# Patient Record
Sex: MALE | Race: WHITE | URBAN - METROPOLITAN AREA
[De-identification: names, ages, dates, MRNs, and addresses within clinical notes are randomized per-mention and may not be internally consistent; named-entity substitution may affect disease eponyms.]

---

## 2018-06-20 ENCOUNTER — PAT TELEPHONE (OUTPATIENT)
Dept: PREADMISSION TESTING | Age: 73
End: 2018-06-20

## 2018-06-20 VITALS — BODY MASS INDEX: 23.8 KG/M2 | WEIGHT: 170 LBS | HEIGHT: 71 IN

## 2018-06-20 RX ORDER — M-VIT,TX,IRON,MINS/CALC/FOLIC 27MG-0.4MG
1 TABLET ORAL DAILY
COMMUNITY

## 2018-06-20 RX ORDER — BENAZEPRIL HYDROCHLORIDE 40 MG/1
40 TABLET, FILM COATED ORAL DAILY
COMMUNITY

## 2018-06-20 RX ORDER — PRAVASTATIN SODIUM 80 MG/1
80 TABLET ORAL DAILY
COMMUNITY

## 2018-06-20 RX ORDER — AMLODIPINE BESYLATE 5 MG/1
5 TABLET ORAL DAILY
COMMUNITY

## 2018-06-28 ENCOUNTER — HOSPITAL ENCOUNTER (OUTPATIENT)
Dept: ENDOSCOPY | Age: 73
Discharge: HOME | End: 2018-06-28
Attending: INTERNAL MEDICINE | Admitting: INTERNAL MEDICINE

## 2018-06-28 VITALS
BODY MASS INDEX: 23.29 KG/M2 | RESPIRATION RATE: 16 BRPM | DIASTOLIC BLOOD PRESSURE: 65 MMHG | OXYGEN SATURATION: 100 % | TEMPERATURE: 97 F | SYSTOLIC BLOOD PRESSURE: 107 MMHG | WEIGHT: 167 LBS | HEART RATE: 52 BPM

## 2018-06-28 DIAGNOSIS — Z12.11 ENCOUNTER FOR SCREENING FOR MALIGNANT NEOPLASM OF COLON: ICD-10-CM

## 2018-06-28 RX ORDER — MORPHINE SULFATE 4 MG/ML
1 INJECTION, SOLUTION INTRAMUSCULAR; INTRAVENOUS EVERY 5 MIN PRN
Status: DISCONTINUED | OUTPATIENT
Start: 2018-06-28 | End: 2018-06-29 | Stop reason: HOSPADM

## 2018-06-28 RX ORDER — LABETALOL HYDROCHLORIDE 5 MG/ML
5 INJECTION, SOLUTION INTRAVENOUS EVERY 10 MIN PRN
Status: DISCONTINUED | OUTPATIENT
Start: 2018-06-28 | End: 2018-06-29 | Stop reason: HOSPADM

## 2018-06-28 RX ORDER — LIDOCAINE HYDROCHLORIDE 10 MG/ML
1 INJECTION, SOLUTION EPIDURAL; INFILTRATION; INTRACAUDAL; PERINEURAL
Status: ACTIVE | OUTPATIENT
Start: 2018-06-28 | End: 2018-06-28

## 2018-06-28 RX ORDER — DIPHENHYDRAMINE HYDROCHLORIDE 50 MG/ML
12.5 INJECTION INTRAMUSCULAR; INTRAVENOUS
Status: ACTIVE | OUTPATIENT
Start: 2018-06-28 | End: 2018-06-28

## 2018-06-28 RX ORDER — PROMETHAZINE HYDROCHLORIDE 25 MG/ML
6.25 INJECTION, SOLUTION INTRAMUSCULAR; INTRAVENOUS
Status: ACTIVE | OUTPATIENT
Start: 2018-06-28 | End: 2018-06-28

## 2018-06-28 RX ORDER — OXYCODONE HYDROCHLORIDE 5 MG/1
10 TABLET ORAL PRN
Status: ACTIVE | OUTPATIENT
Start: 2018-06-28 | End: 2018-06-28

## 2018-06-28 RX ORDER — SODIUM CHLORIDE 0.9 % (FLUSH) 0.9 %
10 SYRINGE (ML) INJECTION EVERY 12 HOURS SCHEDULED
Status: DISCONTINUED | OUTPATIENT
Start: 2018-06-28 | End: 2018-06-29 | Stop reason: HOSPADM

## 2018-06-28 RX ORDER — MEPERIDINE HYDROCHLORIDE 25 MG/ML
12.5 INJECTION INTRAMUSCULAR; INTRAVENOUS; SUBCUTANEOUS EVERY 5 MIN PRN
Status: DISCONTINUED | OUTPATIENT
Start: 2018-06-28 | End: 2018-06-29 | Stop reason: HOSPADM

## 2018-06-28 RX ORDER — SODIUM CHLORIDE 0.9 % (FLUSH) 0.9 %
10 SYRINGE (ML) INJECTION PRN
Status: DISCONTINUED | OUTPATIENT
Start: 2018-06-28 | End: 2018-06-29 | Stop reason: HOSPADM

## 2018-06-28 RX ORDER — OXYCODONE HYDROCHLORIDE 5 MG/1
5 TABLET ORAL PRN
Status: ACTIVE | OUTPATIENT
Start: 2018-06-28 | End: 2018-06-28

## 2018-06-28 RX ORDER — HYDRALAZINE HYDROCHLORIDE 20 MG/ML
5 INJECTION INTRAMUSCULAR; INTRAVENOUS EVERY 10 MIN PRN
Status: DISCONTINUED | OUTPATIENT
Start: 2018-06-28 | End: 2018-06-29 | Stop reason: HOSPADM

## 2018-06-28 RX ORDER — SODIUM CHLORIDE 9 MG/ML
INJECTION, SOLUTION INTRAVENOUS CONTINUOUS
Status: DISCONTINUED | OUTPATIENT
Start: 2018-06-28 | End: 2018-06-29 | Stop reason: HOSPADM

## 2018-06-28 RX ORDER — METOCLOPRAMIDE HYDROCHLORIDE 5 MG/ML
10 INJECTION INTRAMUSCULAR; INTRAVENOUS
Status: ACTIVE | OUTPATIENT
Start: 2018-06-28 | End: 2018-06-28

## 2018-06-28 RX ADMIN — SODIUM CHLORIDE: 9 INJECTION, SOLUTION INTRAVENOUS at 11:42

## 2018-06-28 ASSESSMENT — PAIN SCALES - WONG BAKER: WONGBAKER_NUMERICALRESPONSE: 0

## 2018-06-28 ASSESSMENT — PAIN - FUNCTIONAL ASSESSMENT: PAIN_FUNCTIONAL_ASSESSMENT: 0-10

## 2018-06-28 ASSESSMENT — PAIN SCALES - GENERAL
PAINLEVEL_OUTOF10: 0

## 2023-05-26 ENCOUNTER — ANESTHESIA EVENT (OUTPATIENT)
Dept: ENDOSCOPY | Age: 78
End: 2023-05-26
Payer: MEDICARE

## 2023-05-30 ENCOUNTER — ANESTHESIA (OUTPATIENT)
Dept: ENDOSCOPY | Age: 78
End: 2023-05-30
Payer: MEDICARE

## 2023-05-30 ENCOUNTER — HOSPITAL ENCOUNTER (OUTPATIENT)
Age: 78
Setting detail: OUTPATIENT SURGERY
Discharge: HOME | End: 2023-05-30
Attending: INTERNAL MEDICINE | Admitting: INTERNAL MEDICINE
Payer: MEDICARE

## 2023-05-30 VITALS
WEIGHT: 136.91 LBS | HEART RATE: 78 BPM | HEIGHT: 70 IN | BODY MASS INDEX: 19.6 KG/M2 | RESPIRATION RATE: 40 BRPM | SYSTOLIC BLOOD PRESSURE: 131 MMHG | OXYGEN SATURATION: 97 % | DIASTOLIC BLOOD PRESSURE: 70 MMHG | TEMPERATURE: 97.4 F

## 2023-05-30 DIAGNOSIS — R13.10 DYSPHAGIA, UNSPECIFIED TYPE: ICD-10-CM

## 2023-05-30 PROCEDURE — 2709999900 HC NON-CHARGEABLE SUPPLY: Performed by: INTERNAL MEDICINE

## 2023-05-30 PROCEDURE — 7100000011 HC PHASE II RECOVERY - ADDTL 15 MIN: Performed by: INTERNAL MEDICINE

## 2023-05-30 PROCEDURE — 3700000000 HC ANESTHESIA ATTENDED CARE: Performed by: INTERNAL MEDICINE

## 2023-05-30 PROCEDURE — 94761 N-INVAS EAR/PLS OXIMETRY MLT: CPT

## 2023-05-30 PROCEDURE — 7100000010 HC PHASE II RECOVERY - FIRST 15 MIN: Performed by: INTERNAL MEDICINE

## 2023-05-30 PROCEDURE — 3609013500 HC EGD REMOVAL TUMOR POLYP/OTHER LESION SNARE TECH: Performed by: INTERNAL MEDICINE

## 2023-05-30 PROCEDURE — 2500000003 HC RX 250 WO HCPCS: Performed by: STUDENT IN AN ORGANIZED HEALTH CARE EDUCATION/TRAINING PROGRAM

## 2023-05-30 PROCEDURE — 6370000000 HC RX 637 (ALT 250 FOR IP): Performed by: STUDENT IN AN ORGANIZED HEALTH CARE EDUCATION/TRAINING PROGRAM

## 2023-05-30 PROCEDURE — 2720000010 HC SURG SUPPLY STERILE: Performed by: INTERNAL MEDICINE

## 2023-05-30 PROCEDURE — 7100000001 HC PACU RECOVERY - ADDTL 15 MIN: Performed by: INTERNAL MEDICINE

## 2023-05-30 PROCEDURE — 3700000001 HC ADD 15 MINUTES (ANESTHESIA): Performed by: INTERNAL MEDICINE

## 2023-05-30 PROCEDURE — 2580000003 HC RX 258: Performed by: ANESTHESIOLOGY

## 2023-05-30 PROCEDURE — 6360000002 HC RX W HCPCS: Performed by: NURSE ANESTHETIST, CERTIFIED REGISTERED

## 2023-05-30 PROCEDURE — 7100000000 HC PACU RECOVERY - FIRST 15 MIN: Performed by: INTERNAL MEDICINE

## 2023-05-30 PROCEDURE — 3609012400 HC EGD TRANSORAL BIOPSY SINGLE/MULTIPLE: Performed by: INTERNAL MEDICINE

## 2023-05-30 PROCEDURE — 2500000003 HC RX 250 WO HCPCS: Performed by: NURSE ANESTHETIST, CERTIFIED REGISTERED

## 2023-05-30 PROCEDURE — 94640 AIRWAY INHALATION TREATMENT: CPT

## 2023-05-30 PROCEDURE — 88305 TISSUE EXAM BY PATHOLOGIST: CPT

## 2023-05-30 RX ORDER — SODIUM CHLORIDE 9 MG/ML
INJECTION, SOLUTION INTRAVENOUS PRN
Status: DISCONTINUED | OUTPATIENT
Start: 2023-05-30 | End: 2023-05-30 | Stop reason: HOSPADM

## 2023-05-30 RX ORDER — SODIUM CHLORIDE 0.9 % (FLUSH) 0.9 %
5-40 SYRINGE (ML) INJECTION EVERY 12 HOURS SCHEDULED
Status: DISCONTINUED | OUTPATIENT
Start: 2023-05-30 | End: 2023-05-30 | Stop reason: HOSPADM

## 2023-05-30 RX ORDER — PANTOPRAZOLE SODIUM 40 MG/1
40 TABLET, DELAYED RELEASE ORAL
Qty: 30 TABLET | Refills: 5 | Status: SHIPPED | OUTPATIENT
Start: 2023-05-30

## 2023-05-30 RX ORDER — LABETALOL HYDROCHLORIDE 5 MG/ML
10 INJECTION, SOLUTION INTRAVENOUS
Status: DISCONTINUED | OUTPATIENT
Start: 2023-05-30 | End: 2023-05-30 | Stop reason: HOSPADM

## 2023-05-30 RX ORDER — GLYCOPYRROLATE 0.2 MG/ML
INJECTION INTRAMUSCULAR; INTRAVENOUS PRN
Status: DISCONTINUED | OUTPATIENT
Start: 2023-05-30 | End: 2023-05-30 | Stop reason: SDUPTHER

## 2023-05-30 RX ORDER — SODIUM CHLORIDE 0.9 % (FLUSH) 0.9 %
5-40 SYRINGE (ML) INJECTION PRN
Status: DISCONTINUED | OUTPATIENT
Start: 2023-05-30 | End: 2023-05-30 | Stop reason: HOSPADM

## 2023-05-30 RX ORDER — PROPOFOL 10 MG/ML
INJECTION, EMULSION INTRAVENOUS CONTINUOUS PRN
Status: DISCONTINUED | OUTPATIENT
Start: 2023-05-30 | End: 2023-05-30 | Stop reason: SDUPTHER

## 2023-05-30 RX ORDER — IPRATROPIUM BROMIDE AND ALBUTEROL SULFATE 2.5; .5 MG/3ML; MG/3ML
1 SOLUTION RESPIRATORY (INHALATION)
Status: COMPLETED | OUTPATIENT
Start: 2023-05-30 | End: 2023-05-30

## 2023-05-30 RX ORDER — HYDRALAZINE HYDROCHLORIDE 20 MG/ML
10 INJECTION INTRAMUSCULAR; INTRAVENOUS
Status: DISCONTINUED | OUTPATIENT
Start: 2023-05-30 | End: 2023-05-30 | Stop reason: HOSPADM

## 2023-05-30 RX ORDER — ONDANSETRON 2 MG/ML
4 INJECTION INTRAMUSCULAR; INTRAVENOUS
Status: DISCONTINUED | OUTPATIENT
Start: 2023-05-30 | End: 2023-05-30 | Stop reason: HOSPADM

## 2023-05-30 RX ORDER — LIDOCAINE HYDROCHLORIDE 20 MG/ML
INJECTION, SOLUTION EPIDURAL; INFILTRATION; INTRACAUDAL; PERINEURAL PRN
Status: DISCONTINUED | OUTPATIENT
Start: 2023-05-30 | End: 2023-05-30 | Stop reason: SDUPTHER

## 2023-05-30 RX ORDER — LIDOCAINE HYDROCHLORIDE 10 MG/ML
INJECTION, SOLUTION EPIDURAL; INFILTRATION; INTRACAUDAL; PERINEURAL
Status: DISCONTINUED
Start: 2023-05-30 | End: 2023-05-30 | Stop reason: HOSPADM

## 2023-05-30 RX ORDER — GLYCOPYRROLATE 0.2 MG/ML
INJECTION INTRAMUSCULAR; INTRAVENOUS
Status: COMPLETED
Start: 2023-05-30 | End: 2023-05-30

## 2023-05-30 RX ADMIN — LIDOCAINE HYDROCHLORIDE 50 MG: 20 INJECTION, SOLUTION EPIDURAL; INFILTRATION; INTRACAUDAL; PERINEURAL at 10:15

## 2023-05-30 RX ADMIN — GLYCOPYRROLATE 0.2 MG: 0.2 INJECTION, SOLUTION INTRAMUSCULAR; INTRAVENOUS at 10:15

## 2023-05-30 RX ADMIN — PROPOFOL 80 MG: 10 INJECTION, EMULSION INTRAVENOUS at 09:29

## 2023-05-30 RX ADMIN — PROPOFOL 180 MCG/KG/MIN: 10 INJECTION, EMULSION INTRAVENOUS at 09:28

## 2023-05-30 RX ADMIN — SODIUM CHLORIDE: 9 INJECTION, SOLUTION INTRAVENOUS at 08:58

## 2023-05-30 RX ADMIN — IPRATROPIUM BROMIDE AND ALBUTEROL SULFATE 1 DOSE: 2.5; .5 SOLUTION RESPIRATORY (INHALATION) at 10:08

## 2023-05-30 RX ADMIN — PROPOFOL 30 MG: 10 INJECTION, EMULSION INTRAVENOUS at 09:30

## 2023-05-30 RX ADMIN — LIDOCAINE HYDROCHLORIDE 80 MG: 20 INJECTION, SOLUTION EPIDURAL; INFILTRATION; INTRACAUDAL; PERINEURAL at 09:28

## 2023-05-30 ASSESSMENT — PAIN SCALES - GENERAL
PAINLEVEL_OUTOF10: 0

## 2023-05-30 ASSESSMENT — LIFESTYLE VARIABLES: SMOKING_STATUS: 0

## 2023-05-30 ASSESSMENT — PAIN - FUNCTIONAL ASSESSMENT: PAIN_FUNCTIONAL_ASSESSMENT: 0-10

## 2023-05-30 ASSESSMENT — ENCOUNTER SYMPTOMS: SHORTNESS OF BREATH: 1

## 2023-05-30 NOTE — ANESTHESIA POSTPROCEDURE EVALUATION
Department of Anesthesiology  Postprocedure Note    Patient: Danish De La O  MRN: [id number]  [place]: 1945  Date of evaluation: 5/30/2023      Procedure Summary     Date: 05/30/23 Room / Location: [address] [place] [address]    Anesthesia Start: 8274 Anesthesia Stop: 0951    Procedures:       EGD BIOPSY      EGD POLYP HOT SNARE Diagnosis:       Dysphagia, unspecified type      (DYSPHAGIA)    Surgeons: Mecca Ty MD Responsible Provider: Wander Mercado MD    Anesthesia Type: MAC ASA Status: 3          Anesthesia Type: MAC    Fredo Phase I: Fredo Score: 8    Fredo Phase II: Fredo Score: 10      Anesthesia Post Evaluation    Patient location during evaluation: PACU  Patient participation: complete - patient participated  Level of consciousness: awake and alert  Airway patency: patent  Nausea & Vomiting: no nausea and no vomiting  Complications: no  Cardiovascular status: hemodynamically stable and blood pressure returned to baseline  Respiratory status: spontaneous ventilation and nonlabored ventilation  Hydration status: stable  Comments: Work of breathing greatly improved. Using incentive spirometer well. Weaned to room air. Continues to saturate well. Anticipate return to [facility] for planned discharge home with .

## 2023-05-30 NOTE — ANESTHESIA PRE PROCEDURE
Department of Anesthesiology  Preprocedure Note       Name:  Jo-Ann Oscar   Age:  68 y.o.  :  1945                                          MRN:  [id number]         Date:  2023      Surgeon: Salvador Corley):  Lewie Soulier, MD    Procedure: Procedure(s):  ESOPHAGOGASTRODUODENOSCOPY    Medications prior to admission:   Prior to Admission medications    Medication Sig Start Date End Date Taking? Authorizing Provider   benazepril (LOTENSIN) 40 MG tablet Take 1 tablet by mouth daily    Historical Provider, MD   amLODIPine (NORVASC) 5 MG tablet Take 1 tablet by mouth daily    Historical Provider, MD   pravastatin (PRAVACHOL) 80 MG tablet Take 1 tablet by mouth daily    Historical Provider, MD   Multiple Vitamins-Minerals (THERAPEUTIC MULTIVITAMIN-MINERALS) tablet Take 1 tablet by mouth daily    Historical Provider, MD   Glucosamine-Chondroit-Vit C-Mn (GLUCOSAMINE 1500 COMPLEX PO) Take by mouth daily    Historical Provider, MD       Current medications:    Current Facility-Administered Medications   Medication Dose Route Frequency Provider Last Rate Last Admin    sodium chloride flush 0.9 % injection 5-40 mL  5-40 mL IntraVENous 2 times per day Zhanna Miguel MD        sodium chloride flush 0.9 % injection 5-40 mL  5-40 mL IntraVENous PRN Zhanna Miguel MD        0.9 % sodium chloride infusion   IntraVENous PRN Zhanna Miguel  mL/hr at 23 0911 NoRateChange at 23 0911       Allergies:  No Known Allergies    Problem List:  There is no problem list on file for this patient.       Past Medical History:        Diagnosis Date    Hyperlipidemia     Hypertension     Idiopathic pulmonary fibrosis ([address]     Pacemaker     [employer]       Past Surgical History:        Procedure Laterality Date    AORTIC VALVE REPLACEMENT      BACK SURGERY      bone chip removed    COLONOSCOPY  2018    Dr Alberto Vargas, polyps   Danish Yang

## 2023-05-30 NOTE — DISCHARGE INSTRUCTIONS
days____________________                                Call for follow-up appointment on:[phone]______________                  Educational information given on:_____11/3/2020__________________                       Please review these discharge instructions this evening or tomorrow for               information you may have forgotten.

## 2023-05-30 NOTE — ANESTHESIA POSTPROCEDURE EVALUATION
Department of Anesthesiology  Postprocedure Note    Patient: Brandon Gracia  MRN: [id number]  [place]: 1945  Date of evaluation: 5/30/2023      Procedure Summary     Date: 05/30/23 Room / Location: [address] [place] [address]    Anesthesia Start: 4647 Anesthesia Stop: 0951    Procedures:       EGD BIOPSY      EGD POLYP HOT SNARE Diagnosis:       Dysphagia, unspecified type      (DYSPHAGIA)    Surgeons: Kristina Fuller MD Responsible Provider: Brisa Cadena MD    Anesthesia Type: MAC ASA Status: 3          Anesthesia Type: MAC    Fredo Phase I: Fredo Score: 7    Fredo Phase II:        Anesthesia Post Evaluation    Patient location during evaluation: PACU  Patient participation: complete - patient participated  Level of consciousness: awake and alert  Airway patency: patent  Nausea & Vomiting: no nausea and no vomiting  Complications: no  Cardiovascular status: hemodynamically stable and blood pressure returned to baseline  Respiratory status: spontaneous ventilation and nasal cannula  Hydration status: stable  Comments: Productive cough, near chronic baseline per Mr. Polina Torres. Minimal improvement with nebulizer treatment. Glycopyrrolate given to reduce oral secretions. Increased work of breathing. Will transition to non-breather to allow patient to recover before attempting to wean.

## 2023-05-30 NOTE — PROGRESS NOTES
Patient to PACU from Endo. Pt asleep. Pt VSS on room air. Quickly desatted to 86%. Placed on 4L NC. Abd soft.

## 2023-05-30 NOTE — OP NOTE
for multiple 2-5 mm white mucosal plaques in the proximal esophagus. These lesions were biopsied to evaluate for lichen planus. A widely patent Schatzki's ring was noted at the GE junction, 37 cm from the incisors. Overlying this Schatzki's ring was a 1 cm island of salmon-colored mucosa. The distal esophagus was examined with white light and narrowband imaging without any other concerning areas. The Schatzki's ring an island of salmon mucosa was biopsied in four-quadrant fashion to dilate the esophagus, as the estimated inner diameter was larger than 20 mm. Stomach: The stomach was examined on forward and retroflexed views. A 3 cm sliding hiatal hernia was present 37 to 40 cm from the incisors. Multiple gastric polyps were present in the gastric body and fundus, including 2 8-10 mm semipedunculated polyps. The 8 and 10 mm semipedunculated polyps were resected with snare cautery and retrieved with Tad Has net. The gastric antrum was notable for mucosal erythema and flecks of hematin without ulceration or erosion. Biopsies were obtained from the gastric antrum, incisura and body to evaluate for H. Pylori. Duodenum: The first and 2nd portions of the duodenum appeared normal with normal villous pattern    The scope was then withdrawn back into the stomach, it was decompressed, and the scope was completely withdrawn. The patient tolerated the procedure well and was taken to the post anesthesia care unit in good condition. Estimated Blood Loss: Minimal     Impression:   1) See post procedure diagnoses    Recommendations:   - The patient is to check the patient portal for pathology results in 7-10 days. - Avoid NSAIDs, and begin pantoprazole 40 mg daily. Resume regular medications otherwise. - Resume diet as tolerated. - Return to the office in 4-6 weeks for follow-up.   If dysphagia persist despite dilation, would recommend high-resolution manometry as well as 24-hour pH with impedance to evaluate for

## 2023-06-27 ENCOUNTER — HOSPITAL ENCOUNTER (OUTPATIENT)
Age: 78
Setting detail: OUTPATIENT SURGERY
Discharge: HOME | End: 2023-06-27
Attending: INTERNAL MEDICINE | Admitting: INTERNAL MEDICINE
Payer: MEDICARE

## 2023-06-27 VITALS
HEART RATE: 74 BPM | SYSTOLIC BLOOD PRESSURE: 148 MMHG | RESPIRATION RATE: 24 BRPM | DIASTOLIC BLOOD PRESSURE: 84 MMHG | OXYGEN SATURATION: 98 %

## 2023-06-27 PROCEDURE — 6370000000 HC RX 637 (ALT 250 FOR IP): Performed by: INTERNAL MEDICINE

## 2023-06-27 PROCEDURE — 2709999900 HC NON-CHARGEABLE SUPPLY: Performed by: INTERNAL MEDICINE

## 2023-06-27 PROCEDURE — 2720000010 HC SURG SUPPLY STERILE: Performed by: INTERNAL MEDICINE

## 2023-06-27 PROCEDURE — 3609019000 HC EGD CAPSULE ENDOSCOPY: Performed by: INTERNAL MEDICINE

## 2023-06-27 RX ORDER — LIDOCAINE HYDROCHLORIDE 20 MG/ML
JELLY TOPICAL PRN
Status: DISCONTINUED | OUTPATIENT
Start: 2023-06-27 | End: 2023-06-27 | Stop reason: ALTCHOICE

## 2023-06-27 ASSESSMENT — PAIN - FUNCTIONAL ASSESSMENT: PAIN_FUNCTIONAL_ASSESSMENT: PREVENTS OR INTERFERES SOME ACTIVE ACTIVITIES AND ADLS

## 2023-08-30 ENCOUNTER — OFFICE VISIT (OUTPATIENT)
Dept: ENT CLINIC | Age: 78
End: 2023-08-30
Payer: MEDICARE

## 2023-08-30 VITALS — DIASTOLIC BLOOD PRESSURE: 75 MMHG | OXYGEN SATURATION: 89 % | HEART RATE: 102 BPM | SYSTOLIC BLOOD PRESSURE: 120 MMHG

## 2023-08-30 DIAGNOSIS — K21.9 LARYNGOPHARYNGEAL REFLUX (LPR): Primary | ICD-10-CM

## 2023-08-30 DIAGNOSIS — J30.0 VASOMOTOR RHINITIS: ICD-10-CM

## 2023-08-30 PROCEDURE — G8427 DOCREV CUR MEDS BY ELIG CLIN: HCPCS | Performed by: OTOLARYNGOLOGY

## 2023-08-30 PROCEDURE — 31231 NASAL ENDOSCOPY DX: CPT | Performed by: OTOLARYNGOLOGY

## 2023-08-30 PROCEDURE — 1123F ACP DISCUSS/DSCN MKR DOCD: CPT | Performed by: OTOLARYNGOLOGY

## 2023-08-30 PROCEDURE — 99203 OFFICE O/P NEW LOW 30 MIN: CPT | Performed by: OTOLARYNGOLOGY

## 2023-08-30 PROCEDURE — 1036F TOBACCO NON-USER: CPT | Performed by: OTOLARYNGOLOGY

## 2023-08-30 PROCEDURE — G8420 CALC BMI NORM PARAMETERS: HCPCS | Performed by: OTOLARYNGOLOGY

## 2023-08-30 RX ORDER — IPRATROPIUM BROMIDE 42 UG/1
2 SPRAY, METERED NASAL 4 TIMES DAILY
Qty: 15 ML | Refills: 3 | Status: SHIPPED | OUTPATIENT
Start: 2023-08-30

## 2023-08-30 NOTE — PROGRESS NOTES
[address]      Patient Name: [address] [place] [address] Record Number:  [id number]  Primary Care Physician:  Betina Dior MD  Date of Consultation: 8/30/2023    Chief Complaint: Drainage        HISTORY OF PRESENT ILLNESS  Alison Stock is a(n) 68 y.o. male who presents for evaluation of nasal drainage. The patient says he had a significant nasal drainage. It runs down the back of his throat as well as out of the front of his nose. He is tried Flonase and Astelin. He was actually told by his eye doctor not to use nasal steroids. He does not think that the Flonase or Astelin made a significant difference. He also has severe acid reflux and is on Protonix for this. He has had multiple EGDs. He says that the nasal drainage is worse whenever he eats. Is typically very clear and runny. He is on supplemental oxygen for pulmonary fibrosis. REVIEW OF SYSTEMS  As above    PHYSICAL EXAM  GENERAL: No Acute Distress, Alert and Oriented, no Hoarseness, strong voice  EYES: EOMI, Anti-icteric  HENT:   Head: Normocephalic and atraumatic. Face:  Symmetric, facial nerve intact, no sinus tenderness  Right Ear: Normal external ear, normal external auditory canal, intact tympanic membrane with normal mobility and aerated middle ear  Left Ear: Normal external ear, normal external auditory canal, intact tympanic membrane with normal mobility and aerated middle ear  Mouth/Oral Cavity:  normal lips, Uvula is midline, no mucosal lesions  Oropharynx/Larynx:  normal oropharynx  Nose:Normal external nasal appearance. See below  NECK: Normal range of motion, no thyromegaly, trachea is midline, no lymphadenopathy, no neck masses, no crepitus            REVIEW OF MEDICAL RECORDS  I reviewed the patient's medical records. Has had multiple EGDs with Dr. Rhina Loera. Has evidence of reflux.         PROCEDURE  Flexible nasal endoscopy and laryngoscopy  Afrin and lidocaine were

## 2023-10-02 ENCOUNTER — OFFICE VISIT (OUTPATIENT)
Dept: ENT CLINIC | Age: 78
End: 2023-10-02
Payer: MEDICARE

## 2023-10-02 VITALS
DIASTOLIC BLOOD PRESSURE: 77 MMHG | OXYGEN SATURATION: 98 % | BODY MASS INDEX: 16.03 KG/M2 | HEART RATE: 106 BPM | WEIGHT: 112 LBS | SYSTOLIC BLOOD PRESSURE: 120 MMHG | TEMPERATURE: 98 F | HEIGHT: 70 IN | RESPIRATION RATE: 16 BRPM

## 2023-10-02 DIAGNOSIS — K21.9 LARYNGOPHARYNGEAL REFLUX (LPR): ICD-10-CM

## 2023-10-02 DIAGNOSIS — J34.89 NASAL VESTIBULITIS: ICD-10-CM

## 2023-10-02 DIAGNOSIS — J30.0 VASOMOTOR RHINITIS: Primary | ICD-10-CM

## 2023-10-02 PROCEDURE — 1036F TOBACCO NON-USER: CPT | Performed by: OTOLARYNGOLOGY

## 2023-10-02 PROCEDURE — G8419 CALC BMI OUT NRM PARAM NOF/U: HCPCS | Performed by: OTOLARYNGOLOGY

## 2023-10-02 PROCEDURE — 99214 OFFICE O/P EST MOD 30 MIN: CPT | Performed by: OTOLARYNGOLOGY

## 2023-10-02 PROCEDURE — G8484 FLU IMMUNIZE NO ADMIN: HCPCS | Performed by: OTOLARYNGOLOGY

## 2023-10-02 PROCEDURE — G8427 DOCREV CUR MEDS BY ELIG CLIN: HCPCS | Performed by: OTOLARYNGOLOGY

## 2023-10-02 PROCEDURE — 1123F ACP DISCUSS/DSCN MKR DOCD: CPT | Performed by: OTOLARYNGOLOGY

## 2023-10-02 NOTE — PROGRESS NOTES
[address] [facility]  Follow up      Patient Name: [address] [place] [address] Record Number:  [id number]  Primary Care Physician:  Jen Sanchez MD  Date of Consultation: 10/2/2023    Chief Complaint: Nasal issues        Interval History    Patient is following up for his nasal issues. I saw him on August 30, 2023. I felt as though he had some acid reflux, but also possibly vasomotor rhinitis. I prescribed him Atrovent. He has does not think it is helped much. Is been having a little bit more bleeding and crusting lately. He is on 4 to 5 L of oxygen daily. REVIEW OF SYSTEMS    As above  PHYSICAL EXAM  GENERAL: No Acute Distress, Alert and Oriented, no Hoarseness, strong voice  EYES: EOMI, Anti-icteric  HENT:   Head: Normocephalic and atraumatic. Face:  Symmetric, facial nerve intact, no sinus tenderness  Right Ear: Normal external ear,  Left Ear: Normal external ear,  Mouth/Oral Cavity:  normal lips, Uvula is midline, no mucosal lesions, no trismus  Oropharynx/Larynx:  normal oropharynx  Nose:Normal external nasal appearance. Anterior rhinoscopy shows significant bilateral crusting mostly of the septum. The left side is a little bit worse and he has a leftward septal deviation  NECK: Normal range of motion, no thyromegaly, trachea is midline, no lymphadenopathy, no neck masses, no crepitus          ASSESSMENT/PLAN  1. Vasomotor rhinitis  The Atrovent did not help and may have caused some additional bleeding. I told him to stop this. 2. Laryngopharyngeal reflux (LPR)  Being managed by gastroenterology    3. Nasal vestibulitis  He had a nasal vestibulitis today. He is on quite a bit of supplemental oxygen which is probably contributing to this. I trimmed his cannulas to try to direct the ear more laterally. In addition I am giving him a round of Bactroban ointment. Reportedly he does have a mask he can wear at night rather than the nasal cannula.   I

## 2024-02-28 ENCOUNTER — HOSPITAL ENCOUNTER (INPATIENT)
Age: 79
LOS: 5 days | Discharge: HOME HEALTH | DRG: 177 | End: 2024-03-04
Attending: INTERNAL MEDICINE | Admitting: INTERNAL MEDICINE
Payer: MEDICARE

## 2024-02-28 DIAGNOSIS — J84.112 IPF (IDIOPATHIC PULMONARY FIBROSIS) (HCC): ICD-10-CM

## 2024-02-28 DIAGNOSIS — J86.9: Primary | ICD-10-CM

## 2024-02-28 DIAGNOSIS — J96.12 CHRONIC RESPIRATORY FAILURE WITH HYPOXIA AND HYPERCAPNIA (HCC): ICD-10-CM

## 2024-02-28 DIAGNOSIS — J96.11 CHRONIC RESPIRATORY FAILURE WITH HYPOXIA AND HYPERCAPNIA (HCC): ICD-10-CM

## 2024-02-28 LAB
ALBUMIN SERPL-MCNC: 3.4 G/DL (ref 3.4–5)
ALBUMIN/GLOB SERPL: 0.8 {RATIO} (ref 1.1–2.2)
ALP SERPL-CCNC: 50 U/L (ref 40–129)
ALT SERPL-CCNC: 10 U/L (ref 10–40)
ANION GAP SERPL CALCULATED.3IONS-SCNC: 8 MMOL/L (ref 3–16)
AST SERPL-CCNC: 25 U/L (ref 15–37)
BASOPHILS # BLD: 0 K/UL (ref 0–0.2)
BASOPHILS NFR BLD: 0.5 %
BILIRUB SERPL-MCNC: <0.2 MG/DL (ref 0–1)
BUN SERPL-MCNC: 18 MG/DL (ref 7–20)
CALCIUM SERPL-MCNC: 9.1 MG/DL (ref 8.3–10.6)
CHLORIDE SERPL-SCNC: 94 MMOL/L (ref 99–110)
CO2 SERPL-SCNC: 40 MMOL/L (ref 21–32)
CREAT SERPL-MCNC: 0.6 MG/DL (ref 0.8–1.3)
DEPRECATED RDW RBC AUTO: 13.3 % (ref 12.4–15.4)
EOSINOPHIL # BLD: 0 K/UL (ref 0–0.6)
EOSINOPHIL NFR BLD: 0.7 %
GFR SERPLBLD CREATININE-BSD FMLA CKD-EPI: >60 ML/MIN/{1.73_M2}
GLUCOSE SERPL-MCNC: 172 MG/DL (ref 70–99)
HCT VFR BLD AUTO: 33.1 % (ref 40.5–52.5)
HGB BLD-MCNC: 11.1 G/DL (ref 13.5–17.5)
LACTATE BLDV-SCNC: 0.6 MMOL/L (ref 0.4–1.9)
LACTATE BLDV-SCNC: 1 MMOL/L (ref 0.4–1.9)
LYMPHOCYTES # BLD: 0.5 K/UL (ref 1–5.1)
LYMPHOCYTES NFR BLD: 8.6 %
MCH RBC QN AUTO: 31.7 PG (ref 26–34)
MCHC RBC AUTO-ENTMCNC: 33.5 G/DL (ref 31–36)
MCV RBC AUTO: 94.5 FL (ref 80–100)
MONOCYTES # BLD: 0.6 K/UL (ref 0–1.3)
MONOCYTES NFR BLD: 9 %
NEUTROPHILS # BLD: 5 K/UL (ref 1.7–7.7)
NEUTROPHILS NFR BLD: 81.2 %
PLATELET # BLD AUTO: 249 K/UL (ref 135–450)
PMV BLD AUTO: 9 FL (ref 5–10.5)
POTASSIUM SERPL-SCNC: 4.1 MMOL/L (ref 3.5–5.1)
PROT SERPL-MCNC: 7.6 G/DL (ref 6.4–8.2)
RBC # BLD AUTO: 3.51 M/UL (ref 4.2–5.9)
SODIUM SERPL-SCNC: 142 MMOL/L (ref 136–145)
WBC # BLD AUTO: 6.2 K/UL (ref 4–11)

## 2024-02-28 PROCEDURE — 6360000002 HC RX W HCPCS: Performed by: PHYSICIAN ASSISTANT

## 2024-02-28 PROCEDURE — 99285 EMERGENCY DEPT VISIT HI MDM: CPT

## 2024-02-28 PROCEDURE — 96366 THER/PROPH/DIAG IV INF ADDON: CPT

## 2024-02-28 PROCEDURE — 80053 COMPREHEN METABOLIC PANEL: CPT

## 2024-02-28 PROCEDURE — 83605 ASSAY OF LACTIC ACID: CPT

## 2024-02-28 PROCEDURE — 96365 THER/PROPH/DIAG IV INF INIT: CPT

## 2024-02-28 PROCEDURE — 2060000000 HC ICU INTERMEDIATE R&B

## 2024-02-28 PROCEDURE — 2580000003 HC RX 258: Performed by: PHYSICIAN ASSISTANT

## 2024-02-28 PROCEDURE — 36415 COLL VENOUS BLD VENIPUNCTURE: CPT

## 2024-02-28 PROCEDURE — 6370000000 HC RX 637 (ALT 250 FOR IP): Performed by: INTERNAL MEDICINE

## 2024-02-28 PROCEDURE — 96368 THER/DIAG CONCURRENT INF: CPT

## 2024-02-28 PROCEDURE — 2580000003 HC RX 258: Performed by: INTERNAL MEDICINE

## 2024-02-28 PROCEDURE — 87641 MR-STAPH DNA AMP PROBE: CPT

## 2024-02-28 PROCEDURE — 85025 COMPLETE CBC W/AUTO DIFF WBC: CPT

## 2024-02-28 PROCEDURE — 87040 BLOOD CULTURE FOR BACTERIA: CPT

## 2024-02-28 RX ORDER — PANTOPRAZOLE SODIUM 40 MG/1
40 TABLET, DELAYED RELEASE ORAL
Status: DISCONTINUED | OUTPATIENT
Start: 2024-02-29 | End: 2024-03-04 | Stop reason: HOSPADM

## 2024-02-28 RX ORDER — POTASSIUM CHLORIDE 7.45 MG/ML
10 INJECTION INTRAVENOUS PRN
Status: DISCONTINUED | OUTPATIENT
Start: 2024-02-28 | End: 2024-03-04 | Stop reason: HOSPADM

## 2024-02-28 RX ORDER — POLYETHYLENE GLYCOL 3350 17 G/17G
17 POWDER, FOR SOLUTION ORAL DAILY
COMMUNITY

## 2024-02-28 RX ORDER — LOSARTAN POTASSIUM 50 MG/1
50 TABLET ORAL NIGHTLY
Status: DISCONTINUED | OUTPATIENT
Start: 2024-02-28 | End: 2024-03-04 | Stop reason: HOSPADM

## 2024-02-28 RX ORDER — ONDANSETRON 4 MG/1
4 TABLET, ORALLY DISINTEGRATING ORAL EVERY 8 HOURS PRN
Status: DISCONTINUED | OUTPATIENT
Start: 2024-02-28 | End: 2024-03-04 | Stop reason: HOSPADM

## 2024-02-28 RX ORDER — SODIUM CHLORIDE 0.9 % (FLUSH) 0.9 %
5-40 SYRINGE (ML) INJECTION EVERY 12 HOURS SCHEDULED
Status: DISCONTINUED | OUTPATIENT
Start: 2024-02-28 | End: 2024-03-04 | Stop reason: HOSPADM

## 2024-02-28 RX ORDER — DEXLANSOPRAZOLE 60 MG/1
60 CAPSULE, DELAYED RELEASE ORAL DAILY
COMMUNITY

## 2024-02-28 RX ORDER — PRAVASTATIN SODIUM 40 MG
80 TABLET ORAL NIGHTLY
Status: DISCONTINUED | OUTPATIENT
Start: 2024-02-28 | End: 2024-03-04 | Stop reason: HOSPADM

## 2024-02-28 RX ORDER — POLYETHYLENE GLYCOL 3350 17 G/17G
17 POWDER, FOR SOLUTION ORAL DAILY PRN
Status: DISCONTINUED | OUTPATIENT
Start: 2024-02-28 | End: 2024-03-04 | Stop reason: HOSPADM

## 2024-02-28 RX ORDER — SODIUM CHLORIDE 9 MG/ML
INJECTION, SOLUTION INTRAVENOUS PRN
Status: DISCONTINUED | OUTPATIENT
Start: 2024-02-28 | End: 2024-03-04 | Stop reason: HOSPADM

## 2024-02-28 RX ORDER — LOSARTAN POTASSIUM 50 MG/1
50 TABLET ORAL NIGHTLY
COMMUNITY

## 2024-02-28 RX ORDER — POLYETHYLENE GLYCOL 3350 17 G/17G
17 POWDER, FOR SOLUTION ORAL DAILY
Status: DISCONTINUED | OUTPATIENT
Start: 2024-02-29 | End: 2024-03-04 | Stop reason: HOSPADM

## 2024-02-28 RX ORDER — ONDANSETRON 2 MG/ML
4 INJECTION INTRAMUSCULAR; INTRAVENOUS EVERY 6 HOURS PRN
Status: DISCONTINUED | OUTPATIENT
Start: 2024-02-28 | End: 2024-03-04 | Stop reason: HOSPADM

## 2024-02-28 RX ORDER — ALBUTEROL SULFATE 2.5 MG/3ML
2.5 SOLUTION RESPIRATORY (INHALATION) EVERY 4 HOURS PRN
Status: DISCONTINUED | OUTPATIENT
Start: 2024-02-28 | End: 2024-03-04 | Stop reason: HOSPADM

## 2024-02-28 RX ORDER — BENZONATATE 100 MG/1
100 CAPSULE ORAL 3 TIMES DAILY PRN
COMMUNITY

## 2024-02-28 RX ORDER — ASPIRIN 81 MG/1
81 TABLET ORAL DAILY
Status: DISCONTINUED | OUTPATIENT
Start: 2024-02-29 | End: 2024-03-04 | Stop reason: HOSPADM

## 2024-02-28 RX ORDER — SODIUM CHLORIDE 0.9 % (FLUSH) 0.9 %
5-40 SYRINGE (ML) INJECTION PRN
Status: DISCONTINUED | OUTPATIENT
Start: 2024-02-28 | End: 2024-03-04 | Stop reason: HOSPADM

## 2024-02-28 RX ORDER — VANCOMYCIN 1.75 G/350ML
25 INJECTION, SOLUTION INTRAVENOUS ONCE
Status: DISCONTINUED | OUTPATIENT
Start: 2024-02-28 | End: 2024-02-28

## 2024-02-28 RX ORDER — MAGNESIUM SULFATE IN WATER 40 MG/ML
2000 INJECTION, SOLUTION INTRAVENOUS PRN
Status: DISCONTINUED | OUTPATIENT
Start: 2024-02-28 | End: 2024-03-04 | Stop reason: HOSPADM

## 2024-02-28 RX ORDER — AMLODIPINE BESYLATE 5 MG/1
5 TABLET ORAL DAILY
Status: DISCONTINUED | OUTPATIENT
Start: 2024-02-29 | End: 2024-03-04 | Stop reason: HOSPADM

## 2024-02-28 RX ORDER — ENOXAPARIN SODIUM 100 MG/ML
30 INJECTION SUBCUTANEOUS DAILY
Status: DISCONTINUED | OUTPATIENT
Start: 2024-02-29 | End: 2024-03-04 | Stop reason: HOSPADM

## 2024-02-28 RX ORDER — BENZONATATE 100 MG/1
100 CAPSULE ORAL 3 TIMES DAILY PRN
Status: DISCONTINUED | OUTPATIENT
Start: 2024-02-28 | End: 2024-03-04 | Stop reason: HOSPADM

## 2024-02-28 RX ORDER — ACETAMINOPHEN 325 MG/1
650 TABLET ORAL EVERY 6 HOURS PRN
Status: DISCONTINUED | OUTPATIENT
Start: 2024-02-28 | End: 2024-03-04 | Stop reason: HOSPADM

## 2024-02-28 RX ORDER — ASPIRIN 81 MG/1
81 TABLET ORAL DAILY
COMMUNITY

## 2024-02-28 RX ORDER — POTASSIUM CHLORIDE 20 MEQ/1
40 TABLET, EXTENDED RELEASE ORAL PRN
Status: DISCONTINUED | OUTPATIENT
Start: 2024-02-28 | End: 2024-03-04 | Stop reason: HOSPADM

## 2024-02-28 RX ORDER — ACETAMINOPHEN 650 MG/1
650 SUPPOSITORY RECTAL EVERY 6 HOURS PRN
Status: DISCONTINUED | OUTPATIENT
Start: 2024-02-28 | End: 2024-03-04 | Stop reason: HOSPADM

## 2024-02-28 RX ADMIN — PRAVASTATIN SODIUM 80 MG: 40 TABLET ORAL at 22:30

## 2024-02-28 RX ADMIN — LOSARTAN POTASSIUM 50 MG: 50 TABLET, FILM COATED ORAL at 22:29

## 2024-02-28 RX ADMIN — SODIUM CHLORIDE, PRESERVATIVE FREE 10 ML: 5 INJECTION INTRAVENOUS at 22:38

## 2024-02-28 RX ADMIN — VANCOMYCIN HYDROCHLORIDE 1000 MG: 1 INJECTION, POWDER, LYOPHILIZED, FOR SOLUTION INTRAVENOUS at 17:31

## 2024-02-28 RX ADMIN — CEFEPIME 2000 MG: 2 INJECTION, POWDER, FOR SOLUTION INTRAVENOUS at 16:32

## 2024-02-28 ASSESSMENT — LIFESTYLE VARIABLES
HOW MANY STANDARD DRINKS CONTAINING ALCOHOL DO YOU HAVE ON A TYPICAL DAY: PATIENT DOES NOT DRINK
HOW OFTEN DO YOU HAVE A DRINK CONTAINING ALCOHOL: NEVER

## 2024-02-28 ASSESSMENT — PAIN DESCRIPTION - LOCATION: LOCATION: RIB CAGE

## 2024-02-28 ASSESSMENT — PAIN SCALES - GENERAL
PAINLEVEL_OUTOF10: 0
PAINLEVEL_OUTOF10: 1

## 2024-02-28 ASSESSMENT — PAIN - FUNCTIONAL ASSESSMENT: PAIN_FUNCTIONAL_ASSESSMENT: 0-10

## 2024-02-28 NOTE — ED NOTES
Report called to  Marco  RN   To Room 5252  Cardiac monitor on during transfer  Pt's pain level denies  VSS, Afebrile   IV site is clean, dry and intact, Normal saline locked   Family updated on transfer per pt

## 2024-02-28 NOTE — CARE COORDINATION
Case Management Assessment  Initial Evaluation    Date/Time of Evaluation: 2/28/2024 6:10 PM  Assessment Completed by: LILIA Terry, ANAMW    If patient is discharged prior to next notation, then this note serves as note for discharge by case management.    Patient Name: Danny Frances                   YOB: 1945  Diagnosis: No admission diagnoses are documented for this encounter.                   Date / Time: 2/28/2024  2:21 PM    Patient Admission Status: Emergency   Readmission Risk (Low < 19, Mod (19-27), High > 27): No data recorded  Current PCP: Bowen Henson Jr., MD  PCP verified by CM? Yes    Chart Reviewed: Yes      History Provided by: Patient  Patient Orientation: Alert and Oriented    Patient Cognition: Alert    Hospitalization in the last 30 days (Readmission):  No    If yes, Readmission Assessment in  Navigator will be completed.    Advance Directives:      Code Status: Not on file   Patient's Primary Decision Maker is: Legal Next of Kin      Discharge Planning:    Patient lives with: Spouse/Significant Other Type of Home: House  Primary Care Giver: Self  Patient Support Systems include: Spouse/Significant Other   Current Financial resources: Medicare  Current community resources: None  Current services prior to admission: Durable Medical Equipment            Current DME: Cane, Walker, Wheelchair, Oxygen Therapy (Comment) ([employer] for home oxygen.)            Type of Home Care services:  OT, PT, Nursing Services    ADLS  Prior functional level: Cooking, Housework, Assistance with the following:, Other (see comment) (transportation.)  Current functional level: Other (see comment) (TBD by therapies when stable.)    PT AM-PAC:   /24  OT AM-PAC:   /24    Family can provide assistance at DC: Yes  Would you like Case Management to discuss the discharge plan with any other family members/significant others, and if so, who? Yes (wife if needed.)  Plans to Return to Present Housing:

## 2024-02-28 NOTE — H&P
Hospital Medicine History & Physical      Date of Admission: 2/28/2024    Date of Service:  Pt seen/examined on 2/28/2024     [x]Admitted to Inpatient with expected LOS greater than two midnights due to medical therapy.  []Placed in Observation status.    Chief Admission Complaint: Pyo pneumothorax    Presenting Admission History:      78 y.o. male who presented to [facility] with worsening dyspnea.  PMHx significant for pulmonary fibrosis, chronic hypoxic spray failure on 4 L nasal cannula, hypertension, chronic GERD, hyperlipidemia.  Patient has Noticing increased dyspnea with activities that even walking few steps she will get short of breath in spite of him being on 4 L nasal cannula said his pulse ox will drop to the mid 70s, he started to have some chest discomfort on the right side today.  Denied any fever or chills no nausea no vomiting.  Has been eating and drinking well, denied any urinary symptoms no constipation no diarrhea.  Patient presented to pulmonary who had CT scan done at an outside facility with concern for pneumothorax and possible infection, but results were not available since they were done at an outside facility.  Patient had tachypnea with respiratory rate of 33 blood pressure highest was 157/71, BNP was within normal limit with lactic acid of 1.0, CBC showed white blood cells of 6.2, glucose was 172.  Blood culture has been obtained in the emergency room.    Patient requested to be full code and said that his wife is his medical power of .    Assessment/Plan:      Current Principal Problem:  Pyopneumothorax (HCC)    1.  Possible spontaneous pneumothorax in a patient with pulmonary fibrosis, patient to be evaluated by pulmonary, the recommendation was to hold on chest tube placement for now.  2.  Possible pneumonia for which patient was started on cefepime 2 g IV every 8 hours received first dose in the emergency room, also was given a dose of vancomycin 1000 mg IV x 1 which we

## 2024-02-28 NOTE — ACP (ADVANCE CARE PLANNING)
patient/agent/surrogate to review completed ACP document and update if needed with changes in condition, patient preferences or care setting    [x] This note routed to one or more involved healthcare providers Bowen Henson Jr., MD primary care physician.     Respectfully submitted,    Margarette RODRIGUES, NAHUM  [facility]   [phone]    Electronically signed by LILIA Terry, BENI on 2/28/2024 at 6:11 PM

## 2024-02-28 NOTE — CARE COORDINATION
SW noting a newly admitted patient in the ED. We will arrive at bedside momentarily to assess.      Respectfully submitted,     Margarette RODRIGUES, NAHUM  [facility]   [phone]    Electronically signed by LILIA Terry, ANAMW on 2/28/2024 at 5:53 PM

## 2024-02-29 ENCOUNTER — APPOINTMENT (OUTPATIENT)
Dept: GENERAL RADIOLOGY | Age: 79
DRG: 177 | End: 2024-02-29
Payer: MEDICARE

## 2024-02-29 PROBLEM — E88.A CACHEXIA ASSOCIATED WITH PULMONARY FIBROSIS (HCC): Status: ACTIVE | Noted: 2024-02-29

## 2024-02-29 PROBLEM — J47.1 BRONCHIECTASIS WITH ACUTE EXACERBATION (HCC): Status: ACTIVE | Noted: 2024-02-29

## 2024-02-29 PROBLEM — J96.12 CHRONIC RESPIRATORY FAILURE WITH HYPOXIA AND HYPERCAPNIA (HCC): Status: ACTIVE | Noted: 2024-02-29

## 2024-02-29 PROBLEM — Z95.2 HISTORY OF TRANSCATHETER AORTIC VALVE REPLACEMENT (TAVR): Status: ACTIVE | Noted: 2024-02-29

## 2024-02-29 PROBLEM — J94.8 HYDROPNEUMOTHORAX: Status: ACTIVE | Noted: 2024-02-29

## 2024-02-29 PROBLEM — J18.9 PNEUMONIA OF RIGHT LOWER LOBE DUE TO INFECTIOUS ORGANISM: Status: ACTIVE | Noted: 2024-02-29

## 2024-02-29 PROBLEM — J96.11 CHRONIC RESPIRATORY FAILURE WITH HYPOXIA AND HYPERCAPNIA (HCC): Status: ACTIVE | Noted: 2024-02-29

## 2024-02-29 PROBLEM — J84.10 CACHEXIA ASSOCIATED WITH PULMONARY FIBROSIS (HCC): Status: ACTIVE | Noted: 2024-02-29

## 2024-02-29 PROBLEM — J84.112 IPF (IDIOPATHIC PULMONARY FIBROSIS) (HCC): Status: ACTIVE | Noted: 2024-02-29

## 2024-02-29 LAB
ANION GAP SERPL CALCULATED.3IONS-SCNC: 3 MMOL/L (ref 3–16)
BASOPHILS # BLD: 0 K/UL (ref 0–0.2)
BASOPHILS NFR BLD: 0.3 %
BUN SERPL-MCNC: 14 MG/DL (ref 7–20)
CALCIUM SERPL-MCNC: 9.2 MG/DL (ref 8.3–10.6)
CHLORIDE SERPL-SCNC: 96 MMOL/L (ref 99–110)
CO2 SERPL-SCNC: 45 MMOL/L (ref 21–32)
CREAT SERPL-MCNC: 0.5 MG/DL (ref 0.8–1.3)
DEPRECATED RDW RBC AUTO: 12.9 % (ref 12.4–15.4)
EOSINOPHIL # BLD: 0.1 K/UL (ref 0–0.6)
EOSINOPHIL NFR BLD: 1.6 %
GFR SERPLBLD CREATININE-BSD FMLA CKD-EPI: >60 ML/MIN/{1.73_M2}
GLUCOSE SERPL-MCNC: 90 MG/DL (ref 70–99)
HCT VFR BLD AUTO: 33.1 % (ref 40.5–52.5)
HGB BLD-MCNC: 11.1 G/DL (ref 13.5–17.5)
LYMPHOCYTES # BLD: 0.6 K/UL (ref 1–5.1)
LYMPHOCYTES NFR BLD: 12.3 %
MCH RBC QN AUTO: 31.6 PG (ref 26–34)
MCHC RBC AUTO-ENTMCNC: 33.6 G/DL (ref 31–36)
MCV RBC AUTO: 94 FL (ref 80–100)
MONOCYTES # BLD: 0.5 K/UL (ref 0–1.3)
MONOCYTES NFR BLD: 9.6 %
MRSA DNA SPEC QL NAA+PROBE: NORMAL
NEUTROPHILS # BLD: 4 K/UL (ref 1.7–7.7)
NEUTROPHILS NFR BLD: 76.2 %
PLATELET # BLD AUTO: 213 K/UL (ref 135–450)
PMV BLD AUTO: 8.5 FL (ref 5–10.5)
POTASSIUM SERPL-SCNC: 3.6 MMOL/L (ref 3.5–5.1)
PROCALCITONIN SERPL IA-MCNC: 0.4 NG/ML (ref 0–0.15)
RBC # BLD AUTO: 3.52 M/UL (ref 4.2–5.9)
SODIUM SERPL-SCNC: 144 MMOL/L (ref 136–145)
WBC # BLD AUTO: 5.2 K/UL (ref 4–11)

## 2024-02-29 PROCEDURE — 2580000003 HC RX 258: Performed by: INTERNAL MEDICINE

## 2024-02-29 PROCEDURE — 99223 1ST HOSP IP/OBS HIGH 75: CPT | Performed by: INTERNAL MEDICINE

## 2024-02-29 PROCEDURE — 84145 PROCALCITONIN (PCT): CPT

## 2024-02-29 PROCEDURE — 36415 COLL VENOUS BLD VENIPUNCTURE: CPT

## 2024-02-29 PROCEDURE — 2700000000 HC OXYGEN THERAPY PER DAY

## 2024-02-29 PROCEDURE — 85025 COMPLETE CBC W/AUTO DIFF WBC: CPT

## 2024-02-29 PROCEDURE — 71045 X-RAY EXAM CHEST 1 VIEW: CPT

## 2024-02-29 PROCEDURE — 80048 BASIC METABOLIC PNL TOTAL CA: CPT

## 2024-02-29 PROCEDURE — 6370000000 HC RX 637 (ALT 250 FOR IP): Performed by: INTERNAL MEDICINE

## 2024-02-29 PROCEDURE — 94760 N-INVAS EAR/PLS OXIMETRY 1: CPT

## 2024-02-29 PROCEDURE — 2060000000 HC ICU INTERMEDIATE R&B

## 2024-02-29 PROCEDURE — 6360000002 HC RX W HCPCS: Performed by: INTERNAL MEDICINE

## 2024-02-29 RX ORDER — METRONIDAZOLE 500 MG/100ML
500 INJECTION, SOLUTION INTRAVENOUS EVERY 8 HOURS
Status: DISCONTINUED | OUTPATIENT
Start: 2024-03-01 | End: 2024-03-04 | Stop reason: HOSPADM

## 2024-02-29 RX ADMIN — AMLODIPINE BESYLATE 5 MG: 5 TABLET ORAL at 08:42

## 2024-02-29 RX ADMIN — POLYETHYLENE GLYCOL 3350 17 G: 17 POWDER, FOR SOLUTION ORAL at 08:42

## 2024-02-29 RX ADMIN — CEFEPIME 2000 MG: 2 INJECTION, POWDER, FOR SOLUTION INTRAVENOUS at 00:06

## 2024-02-29 RX ADMIN — PRAVASTATIN SODIUM 80 MG: 40 TABLET ORAL at 20:54

## 2024-02-29 RX ADMIN — VANCOMYCIN HYDROCHLORIDE 750 MG: 750 INJECTION, POWDER, LYOPHILIZED, FOR SOLUTION INTRAVENOUS at 05:15

## 2024-02-29 RX ADMIN — ASPIRIN 81 MG: 81 TABLET, COATED ORAL at 08:42

## 2024-02-29 RX ADMIN — SODIUM CHLORIDE, PRESERVATIVE FREE 10 ML: 5 INJECTION INTRAVENOUS at 08:43

## 2024-02-29 RX ADMIN — LOSARTAN POTASSIUM 50 MG: 50 TABLET, FILM COATED ORAL at 20:54

## 2024-02-29 RX ADMIN — PANTOPRAZOLE SODIUM 40 MG: 40 TABLET, DELAYED RELEASE ORAL at 05:15

## 2024-02-29 RX ADMIN — CEFEPIME 2000 MG: 2 INJECTION, POWDER, FOR SOLUTION INTRAVENOUS at 16:09

## 2024-02-29 RX ADMIN — ENOXAPARIN SODIUM 30 MG: 100 INJECTION SUBCUTANEOUS at 08:42

## 2024-02-29 RX ADMIN — CEFEPIME 2000 MG: 2 INJECTION, POWDER, FOR SOLUTION INTRAVENOUS at 09:25

## 2024-02-29 RX ADMIN — SODIUM CHLORIDE: 9 INJECTION, SOLUTION INTRAVENOUS at 00:04

## 2024-02-29 NOTE — ED PROVIDER NOTES
Pyopneumothorax (HCC)    2. Chronic respiratory failure with hypoxia and hypercapnia (HCC)    3. IPF (idiopathic pulmonary fibrosis) (HCC)          DISPOSITION/PLAN     DISPOSITION Admitted 02/28/2024 06:32:25 PM      PATIENT REFERRED TO:  No follow-up provider specified.    DISCHARGE MEDICATIONS:  New Prescriptions    No medications on file       DISCONTINUED MEDICATIONS:  Discontinued Medications    BENAZEPRIL (LOTENSIN) 40 MG TABLET    Take 1 tablet by mouth daily    GLUCOSAMINE-CHONDROIT-VIT C-MN (GLUCOSAMINE 1500 COMPLEX PO)    Take by mouth daily    IPRATROPIUM (ATROVENT) 0.06 % NASAL SPRAY    2 sprays by Each Nostril route 4 times daily    PANTOPRAZOLE (PROTONIX) 40 MG TABLET    Take 1 tablet by mouth every morning (before breakfast)              (Please note that portions of this note were completed with a voice recognition program.  Efforts were made to edit the dictations but occasionally words are mis-transcribed.)    Dena العراقي PA-C (electronically signed)           Dena العراقي PA-C  02/28/24 1952

## 2024-02-29 NOTE — CONSULTS
Infectious Diseases Inpatient Consult Note      Reason for Consult:  Rt side PNA with Hydropneumothorax on the CT chest     Requesting Physician:  Dr. Coats      Primary Care Physician:  Bowen Henson Jr., MD    History Obtained From:  Epic and pt     CHIEF COMPLAINT:     Chief Complaint   Patient presents with    sent by doctor     Pt sent by Dr. Garza after CT scan d/t thinking pt has infection in lungs, per pt. Pt arrives on 5L nasal cannula at 95%. Baseline 4L. Pt has been feeling generalized weakness and SOB with just a couple steps x 1 month         HISTORY OF PRESENT ILLNESS:  78 y.o. man with a significant history for pulmonary fibrosis, chronic hypoxic respiratory failure on 4 L nasal cannula, h/o TAVR hypertension, hyperlipidemia admitted to hospital secondary to increasing shortness of breath cough and some chest pain.  Patient noticed hypoxic into the 70s with exertion with associate with some chest discomfort he underwent CT chest at an outside facility this was ordered by his pulmonary physician secondary to ongoing respiratory symptoms.  CT chest reported to be abnormal with right-sided hydropneumothorax with fibrotic changes ongoing cystic bronchiectasis and infection concern with pneumonia.  Hence patient present to the hospital for further evaluation.  He was already seen by pulmonary team here.  Labs indicate creatinine 0.6 procalcitonin 0.4 LFT normal WBC 6.2 hemoglobin 11.1 blood culture in process, MRSA probe negative, sputum cultures requested.  Given the ongoing need for IV antibiotics we are consulted for recommendations.      Past Medical History:    Past Medical History:   Diagnosis Date    Hyperlipidemia     Hypertension     Idiopathic pulmonary fibrosis (HCC)     Pacemaker     [employer]       Past Surgical History:    Past Surgical History:   Procedure Laterality Date    AORTIC VALVE REPLACEMENT      BACK SURGERY      bone chip removed    COLONOSCOPY  06/28/2018     
Clinical Pharmacy Note  Vancomycin Consult    Danny Frances is a 78 y.o. male ordered vancomycin for pneumonia; consult received from Dr. Boyd to manage therapy. Also receiving cefepime.    Allergies:  Patient has no known allergies.     Temp max:  Temp (24hrs), Av.1 °F (36.7 °C), Min:98 °F (36.7 °C), Max:98.2 °F (36.8 °C)      Recent Labs     24  1512   WBC 6.2       Recent Labs     24  1512   BUN 18   CREATININE 0.6*       No intake or output data in the 24 hours ending 24 2100    Culture Results:  MRSA nasal pending    Ht Readings from Last 1 Encounters:   24 1.778 m (5' 10\")        Wt Readings from Last 1 Encounters:   24 50.8 kg (112 lb)         Estimated Creatinine Clearance: 73 mL/min (A) (based on SCr of 0.6 mg/dL (L)).    Assessment/Plan:  Day # 1 of vancomycin.  1000 mg in ED  Vancomycin 750 mg IV every 12 hours.    Goal -600  Predicted   Random level tomorrow to assess dose.       Thank you for the consult.     
MD at [facility] [[email or web address]] ENDOSCOPY    UPPER GASTROINTESTINAL ENDOSCOPY  05/30/2023    EGD POLYP HOT SNARE performed by Mitchell Iqbal MD at [facility] [[email or web address]] ENDOSCOPY    UPPER GASTROINTESTINAL ENDOSCOPY N/A 6/27/2023    ESOPHAGEAL MANOMETRY, 24 HR IMPEDANCE POTENTIAL OF HYDROGEN performed by Mitchell Iqbal MD at [facility] [[email or web address]] ENDOSCOPY       Allergies:    No Known Allergies      Home Meds:   Prior to Admission medications    Medication Sig Start Date End Date Taking? Authorizing Provider   losartan (COZAAR) 50 MG tablet Take 1 tablet by mouth nightly   Yes Marcus Burgos MD   dexlansoprazole (DEXILANT) 60 MG CPDR delayed release capsule Take 1 capsule by mouth daily   Yes Marcus Burgos MD   benzonatate (TESSALON) 100 MG capsule Take 1 capsule by mouth 3 times daily as needed for Cough   Yes Marcus Burgos MD   polyethylene glycol (GLYCOLAX) 17 g packet Take 1 packet by mouth daily   Yes Marcus Burgos MD   aspirin 81 MG EC tablet Take 1 tablet by mouth daily   Yes Marcus Burgos MD   amLODIPine (NORVASC) 5 MG tablet Take 1 tablet by mouth daily    Marcus Burgos MD   pravastatin (PRAVACHOL) 80 MG tablet Take 1 tablet by mouth nightly    Marcus Burgos MD   Multiple Vitamins-Minerals (THERAPEUTIC MULTIVITAMIN-MINERALS) tablet Take 1 tablet by mouth daily    Marcus Burgos MD       Family History:       Problem Relation Age of Onset    Cancer Mother     No Known Problems Father          Social History:   TOBACCO:   reports that he quit smoking about 51 years ago. His smoking use included cigarettes. He has never used smokeless tobacco.  ETOH:   reports that he does not currently use alcohol.  DRUGS:  reports no history of drug use.          REVIEW OF SYSTEMS:  Review of Systems -   General ROS: Weight loss  Psychological ROS: negative  Ophthalmic ROS: negative  ENT ROS: negative  Allergy and Immunology ROS: negative  Hematological and Lymphatic ROS: negative  Endocrine ROS: negative  Breast ROS:

## 2024-03-01 PROBLEM — R93.89 ABNORMAL CT OF THE CHEST: Status: ACTIVE | Noted: 2024-03-01

## 2024-03-01 PROBLEM — E43 SEVERE MALNUTRITION (HCC): Status: ACTIVE | Noted: 2024-03-01

## 2024-03-01 LAB
ANION GAP SERPL CALCULATED.3IONS-SCNC: 0 MMOL/L (ref 3–16)
BASE EXCESS BLDV CALC-SCNC: 18.2 MMOL/L
BASOPHILS # BLD: 0 K/UL (ref 0–0.2)
BASOPHILS NFR BLD: 0.4 %
BUN SERPL-MCNC: 16 MG/DL (ref 7–20)
CALCIUM SERPL-MCNC: 9.4 MG/DL (ref 8.3–10.6)
CHLORIDE SERPL-SCNC: 93 MMOL/L (ref 99–110)
CO2 BLDV-SCNC: 50 MMOL/L
CO2 SERPL-SCNC: 46 MMOL/L (ref 21–32)
COHGB MFR BLDV: 1.4 %
CREAT SERPL-MCNC: 0.7 MG/DL (ref 0.8–1.3)
CRP SERPL-MCNC: 5.9 MG/L (ref 0–5.1)
DEPRECATED RDW RBC AUTO: 12.9 % (ref 12.4–15.4)
EOSINOPHIL # BLD: 0.1 K/UL (ref 0–0.6)
EOSINOPHIL NFR BLD: 2.2 %
ERYTHROCYTE [SEDIMENTATION RATE] IN BLOOD BY WESTERGREN METHOD: 52 MM/HR (ref 0–20)
GFR SERPLBLD CREATININE-BSD FMLA CKD-EPI: >60 ML/MIN/{1.73_M2}
GLUCOSE SERPL-MCNC: 97 MG/DL (ref 70–99)
HCO3 BLDV-SCNC: 48 MMOL/L (ref 23–29)
HCT VFR BLD AUTO: 32.8 % (ref 40.5–52.5)
HGB BLD-MCNC: 11.1 G/DL (ref 13.5–17.5)
LEGIONELLA AG UR QL: NORMAL
LYMPHOCYTES # BLD: 0.7 K/UL (ref 1–5.1)
LYMPHOCYTES NFR BLD: 15 %
MCH RBC QN AUTO: 32 PG (ref 26–34)
MCHC RBC AUTO-ENTMCNC: 34 G/DL (ref 31–36)
MCV RBC AUTO: 94.2 FL (ref 80–100)
METHGB MFR BLDV: 0.5 %
MONOCYTES # BLD: 0.7 K/UL (ref 0–1.3)
MONOCYTES NFR BLD: 14.3 %
NEUTROPHILS # BLD: 3.3 K/UL (ref 1.7–7.7)
NEUTROPHILS NFR BLD: 68.1 %
O2 THERAPY: ABNORMAL
PCO2 BLDV: 83.8 MMHG (ref 40–50)
PH BLDV: 7.36 [PH] (ref 7.35–7.45)
PLATELET # BLD AUTO: 207 K/UL (ref 135–450)
PMV BLD AUTO: 8.9 FL (ref 5–10.5)
PO2 BLDV: 34 MMHG
POTASSIUM SERPL-SCNC: 3.7 MMOL/L (ref 3.5–5.1)
RBC # BLD AUTO: 3.48 M/UL (ref 4.2–5.9)
S PNEUM AG UR QL: NORMAL
SAO2 % BLDV: 63 %
SODIUM SERPL-SCNC: 139 MMOL/L (ref 136–145)
WBC # BLD AUTO: 4.9 K/UL (ref 4–11)

## 2024-03-01 PROCEDURE — 2700000000 HC OXYGEN THERAPY PER DAY

## 2024-03-01 PROCEDURE — 87449 NOS EACH ORGANISM AG IA: CPT

## 2024-03-01 PROCEDURE — 97162 PT EVAL MOD COMPLEX 30 MIN: CPT

## 2024-03-01 PROCEDURE — 6370000000 HC RX 637 (ALT 250 FOR IP): Performed by: INTERNAL MEDICINE

## 2024-03-01 PROCEDURE — 36415 COLL VENOUS BLD VENIPUNCTURE: CPT

## 2024-03-01 PROCEDURE — 85652 RBC SED RATE AUTOMATED: CPT

## 2024-03-01 PROCEDURE — 99233 SBSQ HOSP IP/OBS HIGH 50: CPT | Performed by: INTERNAL MEDICINE

## 2024-03-01 PROCEDURE — 97166 OT EVAL MOD COMPLEX 45 MIN: CPT

## 2024-03-01 PROCEDURE — 86140 C-REACTIVE PROTEIN: CPT

## 2024-03-01 PROCEDURE — 6360000002 HC RX W HCPCS: Performed by: INTERNAL MEDICINE

## 2024-03-01 PROCEDURE — 82803 BLOOD GASES ANY COMBINATION: CPT

## 2024-03-01 PROCEDURE — 94669 MECHANICAL CHEST WALL OSCILL: CPT

## 2024-03-01 PROCEDURE — 97530 THERAPEUTIC ACTIVITIES: CPT

## 2024-03-01 PROCEDURE — 2580000003 HC RX 258: Performed by: INTERNAL MEDICINE

## 2024-03-01 PROCEDURE — 97535 SELF CARE MNGMENT TRAINING: CPT

## 2024-03-01 PROCEDURE — 2060000000 HC ICU INTERMEDIATE R&B

## 2024-03-01 PROCEDURE — 85025 COMPLETE CBC W/AUTO DIFF WBC: CPT

## 2024-03-01 PROCEDURE — 80048 BASIC METABOLIC PNL TOTAL CA: CPT

## 2024-03-01 RX ADMIN — CEFEPIME 2000 MG: 2 INJECTION, POWDER, FOR SOLUTION INTRAVENOUS at 15:31

## 2024-03-01 RX ADMIN — POLYETHYLENE GLYCOL 3350 17 G: 17 POWDER, FOR SOLUTION ORAL at 09:43

## 2024-03-01 RX ADMIN — METRONIDAZOLE 500 MG: 500 INJECTION, SOLUTION INTRAVENOUS at 00:37

## 2024-03-01 RX ADMIN — ENOXAPARIN SODIUM 30 MG: 100 INJECTION SUBCUTANEOUS at 09:43

## 2024-03-01 RX ADMIN — METRONIDAZOLE 500 MG: 500 INJECTION, SOLUTION INTRAVENOUS at 09:43

## 2024-03-01 RX ADMIN — CEFEPIME 2000 MG: 2 INJECTION, POWDER, FOR SOLUTION INTRAVENOUS at 09:40

## 2024-03-01 RX ADMIN — PANTOPRAZOLE SODIUM 40 MG: 40 TABLET, DELAYED RELEASE ORAL at 06:18

## 2024-03-01 RX ADMIN — ASPIRIN 81 MG: 81 TABLET, COATED ORAL at 09:43

## 2024-03-01 RX ADMIN — SODIUM CHLORIDE: 9 INJECTION, SOLUTION INTRAVENOUS at 00:37

## 2024-03-01 RX ADMIN — LOSARTAN POTASSIUM 50 MG: 50 TABLET, FILM COATED ORAL at 21:17

## 2024-03-01 RX ADMIN — SODIUM CHLORIDE: 9 INJECTION, SOLUTION INTRAVENOUS at 00:28

## 2024-03-01 RX ADMIN — SODIUM CHLORIDE, PRESERVATIVE FREE 10 ML: 5 INJECTION INTRAVENOUS at 09:48

## 2024-03-01 RX ADMIN — METRONIDAZOLE 500 MG: 500 INJECTION, SOLUTION INTRAVENOUS at 15:32

## 2024-03-01 RX ADMIN — PRAVASTATIN SODIUM 80 MG: 40 TABLET ORAL at 21:16

## 2024-03-01 RX ADMIN — CEFEPIME 2000 MG: 2 INJECTION, POWDER, FOR SOLUTION INTRAVENOUS at 00:29

## 2024-03-01 RX ADMIN — AMLODIPINE BESYLATE 5 MG: 5 TABLET ORAL at 09:43

## 2024-03-01 NOTE — CARE COORDINATION
[facility]/ [facility]     Referral received from  to follow for home care services.   I will follow for needs, and speak with patient to verify demos.    Yomi Xiong RN, BSN CTN  [facility] [phone]

## 2024-03-01 NOTE — CARE COORDINATION
ID is following await to see if needs IVAB at discharge.  Met with patient & wife Roddy. Confirm plan is to return home at discharge & they would like home care.  Home care options discussed. Pt/wife would like [facility] as they have used this agency in the past. Referral made to Yomi thompson/ [facility]. Referral also made to Lida thompson/ [facility].    The Plan for Transition of Care is related to the following treatment goals: home care    The patient was provided with a choice of provider and agrees   with the discharge plan. [x] Yes [] No    Freedom of choice list was provided with basic dialogue that supports the patient's individualized plan of care/goals, treatment preferences and shares the quality data associated with the providers. [x] Yes [] No    Electronically signed by Betty Carpenter RN Case Management on 3/1/2024 at 12:04 PM

## 2024-03-01 NOTE — PLAN OF CARE
Problem: Discharge Planning  Goal: Discharge to home or other facility with appropriate resources  Outcome: Progressing     Problem: Nutrition Deficit:  Goal: Optimize nutritional status  Outcome: Progressing     Problem: Skin/Tissue Integrity  Goal: Absence of new skin breakdown  Description: 1.  Monitor for areas of redness and/or skin breakdown  2.  Assess vascular access sites hourly  3.  Every 4-6 hours minimum:  Change oxygen saturation probe site  4.  Every 4-6 hours:  If on nasal continuous positive airway pressure, respiratory therapy assess nares and determine need for appliance change or resting period.  Outcome: Progressing

## 2024-03-02 LAB
ANION GAP SERPL CALCULATED.3IONS-SCNC: 2 MMOL/L (ref 3–16)
BASOPHILS # BLD: 0 K/UL (ref 0–0.2)
BASOPHILS NFR BLD: 0.9 %
BUN SERPL-MCNC: 19 MG/DL (ref 7–20)
CALCIUM SERPL-MCNC: 9.6 MG/DL (ref 8.3–10.6)
CHLORIDE SERPL-SCNC: 98 MMOL/L (ref 99–110)
CO2 SERPL-SCNC: 45 MMOL/L (ref 21–32)
CREAT SERPL-MCNC: 0.6 MG/DL (ref 0.8–1.3)
DEPRECATED RDW RBC AUTO: 13 % (ref 12.4–15.4)
EOSINOPHIL # BLD: 0.1 K/UL (ref 0–0.6)
EOSINOPHIL NFR BLD: 2.9 %
GFR SERPLBLD CREATININE-BSD FMLA CKD-EPI: >60 ML/MIN/{1.73_M2}
GLUCOSE SERPL-MCNC: 87 MG/DL (ref 70–99)
HCT VFR BLD AUTO: 31.2 % (ref 40.5–52.5)
HGB BLD-MCNC: 10.5 G/DL (ref 13.5–17.5)
LYMPHOCYTES # BLD: 0.6 K/UL (ref 1–5.1)
LYMPHOCYTES NFR BLD: 12.6 %
MCH RBC QN AUTO: 31.5 PG (ref 26–34)
MCHC RBC AUTO-ENTMCNC: 33.6 G/DL (ref 31–36)
MCV RBC AUTO: 93.6 FL (ref 80–100)
MONOCYTES # BLD: 0.6 K/UL (ref 0–1.3)
MONOCYTES NFR BLD: 12.8 %
NEUTROPHILS # BLD: 3.5 K/UL (ref 1.7–7.7)
NEUTROPHILS NFR BLD: 70.8 %
PLATELET # BLD AUTO: 205 K/UL (ref 135–450)
PMV BLD AUTO: 8.6 FL (ref 5–10.5)
POTASSIUM SERPL-SCNC: 3.9 MMOL/L (ref 3.5–5.1)
PROCALCITONIN SERPL IA-MCNC: 0.09 NG/ML (ref 0–0.15)
RBC # BLD AUTO: 3.33 M/UL (ref 4.2–5.9)
SODIUM SERPL-SCNC: 145 MMOL/L (ref 136–145)
WBC # BLD AUTO: 5 K/UL (ref 4–11)

## 2024-03-02 PROCEDURE — 2060000000 HC ICU INTERMEDIATE R&B

## 2024-03-02 PROCEDURE — 94760 N-INVAS EAR/PLS OXIMETRY 1: CPT

## 2024-03-02 PROCEDURE — 85025 COMPLETE CBC W/AUTO DIFF WBC: CPT

## 2024-03-02 PROCEDURE — 6360000002 HC RX W HCPCS: Performed by: INTERNAL MEDICINE

## 2024-03-02 PROCEDURE — 2580000003 HC RX 258: Performed by: INTERNAL MEDICINE

## 2024-03-02 PROCEDURE — 84145 PROCALCITONIN (PCT): CPT

## 2024-03-02 PROCEDURE — 6370000000 HC RX 637 (ALT 250 FOR IP): Performed by: INTERNAL MEDICINE

## 2024-03-02 PROCEDURE — 80048 BASIC METABOLIC PNL TOTAL CA: CPT

## 2024-03-02 PROCEDURE — 2700000000 HC OXYGEN THERAPY PER DAY

## 2024-03-02 PROCEDURE — 94669 MECHANICAL CHEST WALL OSCILL: CPT

## 2024-03-02 PROCEDURE — 99233 SBSQ HOSP IP/OBS HIGH 50: CPT | Performed by: INTERNAL MEDICINE

## 2024-03-02 PROCEDURE — 36415 COLL VENOUS BLD VENIPUNCTURE: CPT

## 2024-03-02 RX ADMIN — METRONIDAZOLE 500 MG: 500 INJECTION, SOLUTION INTRAVENOUS at 00:25

## 2024-03-02 RX ADMIN — CEFEPIME 2000 MG: 2 INJECTION, POWDER, FOR SOLUTION INTRAVENOUS at 23:55

## 2024-03-02 RX ADMIN — AMLODIPINE BESYLATE 5 MG: 5 TABLET ORAL at 09:25

## 2024-03-02 RX ADMIN — SODIUM CHLORIDE, PRESERVATIVE FREE 10 ML: 5 INJECTION INTRAVENOUS at 09:38

## 2024-03-02 RX ADMIN — POLYETHYLENE GLYCOL 3350 17 G: 17 POWDER, FOR SOLUTION ORAL at 09:25

## 2024-03-02 RX ADMIN — PANTOPRAZOLE SODIUM 40 MG: 40 TABLET, DELAYED RELEASE ORAL at 06:51

## 2024-03-02 RX ADMIN — METRONIDAZOLE 500 MG: 500 INJECTION, SOLUTION INTRAVENOUS at 15:49

## 2024-03-02 RX ADMIN — CEFEPIME 2000 MG: 2 INJECTION, POWDER, FOR SOLUTION INTRAVENOUS at 15:48

## 2024-03-02 RX ADMIN — ENOXAPARIN SODIUM 30 MG: 100 INJECTION SUBCUTANEOUS at 09:35

## 2024-03-02 RX ADMIN — METRONIDAZOLE 500 MG: 500 INJECTION, SOLUTION INTRAVENOUS at 23:56

## 2024-03-02 RX ADMIN — PRAVASTATIN SODIUM 80 MG: 40 TABLET ORAL at 20:13

## 2024-03-02 RX ADMIN — LOSARTAN POTASSIUM 50 MG: 50 TABLET, FILM COATED ORAL at 20:13

## 2024-03-02 RX ADMIN — CEFEPIME 2000 MG: 2 INJECTION, POWDER, FOR SOLUTION INTRAVENOUS at 09:41

## 2024-03-02 RX ADMIN — ASPIRIN 81 MG: 81 TABLET, COATED ORAL at 09:25

## 2024-03-02 RX ADMIN — METRONIDAZOLE 500 MG: 500 INJECTION, SOLUTION INTRAVENOUS at 09:42

## 2024-03-02 RX ADMIN — CEFEPIME 2000 MG: 2 INJECTION, POWDER, FOR SOLUTION INTRAVENOUS at 00:26

## 2024-03-02 ASSESSMENT — PAIN SCALES - GENERAL: PAINLEVEL_OUTOF10: 0

## 2024-03-02 NOTE — PLAN OF CARE
Problem: Discharge Planning  Goal: Discharge to home or other facility with appropriate resources  3/1/2024 2153 by Hari Byrd RN  Outcome: Progressing  3/1/2024 1550 by Francisca Beck RN  Outcome: Progressing     Problem: Nutrition Deficit:  Goal: Optimize nutritional status  3/1/2024 2153 by Hari Byrd RN  Outcome: Progressing  3/1/2024 1550 by Francisca Beck RN  Outcome: Progressing     Problem: Skin/Tissue Integrity  Goal: Absence of new skin breakdown  Description: 1.  Monitor for areas of redness and/or skin breakdown  2.  Assess vascular access sites hourly  3.  Every 4-6 hours minimum:  Change oxygen saturation probe site  4.  Every 4-6 hours:  If on nasal continuous positive airway pressure, respiratory therapy assess nares and determine need for appliance change or resting period.  3/1/2024 2153 by Hari Byrd RN  Outcome: Progressing  3/1/2024 1550 by Francisca Beck RN  Outcome: Progressing

## 2024-03-03 ENCOUNTER — APPOINTMENT (OUTPATIENT)
Dept: GENERAL RADIOLOGY | Age: 79
DRG: 177 | End: 2024-03-03
Payer: MEDICARE

## 2024-03-03 LAB
ANION GAP SERPL CALCULATED.3IONS-SCNC: 4 MMOL/L (ref 3–16)
BACTERIA BLD CULT ORG #2: NORMAL
BACTERIA BLD CULT: NORMAL
BASOPHILS # BLD: 0 K/UL (ref 0–0.2)
BASOPHILS NFR BLD: 0.6 %
BUN SERPL-MCNC: 19 MG/DL (ref 7–20)
CALCIUM SERPL-MCNC: 8.9 MG/DL (ref 8.3–10.6)
CHLORIDE SERPL-SCNC: 96 MMOL/L (ref 99–110)
CO2 SERPL-SCNC: 43 MMOL/L (ref 21–32)
CREAT SERPL-MCNC: 0.5 MG/DL (ref 0.8–1.3)
DEPRECATED RDW RBC AUTO: 12.9 % (ref 12.4–15.4)
EOSINOPHIL # BLD: 0.1 K/UL (ref 0–0.6)
EOSINOPHIL NFR BLD: 3.1 %
GFR SERPLBLD CREATININE-BSD FMLA CKD-EPI: >60 ML/MIN/{1.73_M2}
GLUCOSE SERPL-MCNC: 77 MG/DL (ref 70–99)
HCT VFR BLD AUTO: 29.5 % (ref 40.5–52.5)
HGB BLD-MCNC: 10.1 G/DL (ref 13.5–17.5)
LYMPHOCYTES # BLD: 0.6 K/UL (ref 1–5.1)
LYMPHOCYTES NFR BLD: 13.7 %
MCH RBC QN AUTO: 31.7 PG (ref 26–34)
MCHC RBC AUTO-ENTMCNC: 34.2 G/DL (ref 31–36)
MCV RBC AUTO: 92.6 FL (ref 80–100)
MONOCYTES # BLD: 0.6 K/UL (ref 0–1.3)
MONOCYTES NFR BLD: 13 %
NEUTROPHILS # BLD: 3.2 K/UL (ref 1.7–7.7)
NEUTROPHILS NFR BLD: 69.6 %
PLATELET # BLD AUTO: 193 K/UL (ref 135–450)
PMV BLD AUTO: 8.8 FL (ref 5–10.5)
POTASSIUM SERPL-SCNC: 3.5 MMOL/L (ref 3.5–5.1)
RBC # BLD AUTO: 3.19 M/UL (ref 4.2–5.9)
SODIUM SERPL-SCNC: 143 MMOL/L (ref 136–145)
WBC # BLD AUTO: 4.6 K/UL (ref 4–11)

## 2024-03-03 PROCEDURE — 6360000002 HC RX W HCPCS: Performed by: INTERNAL MEDICINE

## 2024-03-03 PROCEDURE — 6370000000 HC RX 637 (ALT 250 FOR IP): Performed by: INTERNAL MEDICINE

## 2024-03-03 PROCEDURE — 2060000000 HC ICU INTERMEDIATE R&B

## 2024-03-03 PROCEDURE — 36415 COLL VENOUS BLD VENIPUNCTURE: CPT

## 2024-03-03 PROCEDURE — 85025 COMPLETE CBC W/AUTO DIFF WBC: CPT

## 2024-03-03 PROCEDURE — 80048 BASIC METABOLIC PNL TOTAL CA: CPT

## 2024-03-03 PROCEDURE — 2580000003 HC RX 258: Performed by: INTERNAL MEDICINE

## 2024-03-03 PROCEDURE — 6370000000 HC RX 637 (ALT 250 FOR IP): Performed by: STUDENT IN AN ORGANIZED HEALTH CARE EDUCATION/TRAINING PROGRAM

## 2024-03-03 PROCEDURE — 94760 N-INVAS EAR/PLS OXIMETRY 1: CPT

## 2024-03-03 PROCEDURE — 2700000000 HC OXYGEN THERAPY PER DAY

## 2024-03-03 PROCEDURE — 71045 X-RAY EXAM CHEST 1 VIEW: CPT

## 2024-03-03 RX ADMIN — PRAVASTATIN SODIUM 80 MG: 40 TABLET ORAL at 21:54

## 2024-03-03 RX ADMIN — METRONIDAZOLE 500 MG: 500 INJECTION, SOLUTION INTRAVENOUS at 08:09

## 2024-03-03 RX ADMIN — Medication 1 SPRAY: at 09:17

## 2024-03-03 RX ADMIN — ENOXAPARIN SODIUM 30 MG: 100 INJECTION SUBCUTANEOUS at 09:16

## 2024-03-03 RX ADMIN — CEFEPIME 2000 MG: 2 INJECTION, POWDER, FOR SOLUTION INTRAVENOUS at 08:10

## 2024-03-03 RX ADMIN — ASPIRIN 81 MG: 81 TABLET, COATED ORAL at 09:16

## 2024-03-03 RX ADMIN — METRONIDAZOLE 500 MG: 500 INJECTION, SOLUTION INTRAVENOUS at 23:33

## 2024-03-03 RX ADMIN — AMLODIPINE BESYLATE 5 MG: 5 TABLET ORAL at 09:16

## 2024-03-03 RX ADMIN — SODIUM CHLORIDE, PRESERVATIVE FREE 10 ML: 5 INJECTION INTRAVENOUS at 21:55

## 2024-03-03 RX ADMIN — PANTOPRAZOLE SODIUM 40 MG: 40 TABLET, DELAYED RELEASE ORAL at 05:33

## 2024-03-03 RX ADMIN — LOSARTAN POTASSIUM 50 MG: 50 TABLET, FILM COATED ORAL at 21:54

## 2024-03-03 RX ADMIN — POLYETHYLENE GLYCOL 3350 17 G: 17 POWDER, FOR SOLUTION ORAL at 09:24

## 2024-03-03 RX ADMIN — CEFEPIME 2000 MG: 2 INJECTION, POWDER, FOR SOLUTION INTRAVENOUS at 18:08

## 2024-03-03 RX ADMIN — METRONIDAZOLE 500 MG: 500 INJECTION, SOLUTION INTRAVENOUS at 18:07

## 2024-03-03 ASSESSMENT — PAIN DESCRIPTION - ONSET: ONSET: ON-GOING

## 2024-03-03 ASSESSMENT — PAIN SCALES - GENERAL
PAINLEVEL_OUTOF10: 1
PAINLEVEL_OUTOF10: 0
PAINLEVEL_OUTOF10: 0

## 2024-03-03 ASSESSMENT — PAIN SCALES - WONG BAKER: WONGBAKER_NUMERICALRESPONSE: 0

## 2024-03-03 ASSESSMENT — PAIN DESCRIPTION - PAIN TYPE: TYPE: ACUTE PAIN

## 2024-03-03 ASSESSMENT — PAIN DESCRIPTION - LOCATION: LOCATION: RIB CAGE

## 2024-03-03 ASSESSMENT — PAIN DESCRIPTION - ORIENTATION: ORIENTATION: RIGHT;UPPER

## 2024-03-03 ASSESSMENT — PAIN DESCRIPTION - DESCRIPTORS: DESCRIPTORS: ACHING

## 2024-03-03 ASSESSMENT — PAIN DESCRIPTION - FREQUENCY: FREQUENCY: INTERMITTENT

## 2024-03-03 NOTE — FLOWSHEET NOTE
03/02/24 0943   Treatment Team Notification   Reason for Communication Patient/Family request  (pt asking for saline spray)   Name of Team Member Notified [facility]   Treatment Team Role Attending Provider   Method of Communication Secure Message   Response See orders  (saline spray Q2H PRN ordered)   Notification Time 0943

## 2024-03-03 NOTE — FLOWSHEET NOTE
03/03/24 1330   Treatment Team Notification   Reason for Communication Patient/Family request  (family asking if physician can order a home O2 concentrator with a high flow rate. Pt's current machine goes up to 4.5 L NC. Pt is on 4-5L NC at baseline.)   Name of Team Member Notified [facility]   Treatment Team Role Attending Provider   Method of Communication Secure Message   Response No new orders  (SW will address with pt prior to discharge. RN to provide pt/family with SW contact information.)   Notification Time 0344

## 2024-03-03 NOTE — FLOWSHEET NOTE
24 1216   Assessment   Charting Type Reassessment   Reassessment Performed Changes documented below   Cardiac Monitor   Cardiac/Telemetry Monitor On (S)  No   Treatment Team Notification   Reason for Communication Review case  (pt on monitor but without active orders for continuous telemetry and continuous pulse ox monitoring. Pt is at baseline of 4 L NC and is hemodynamically stable. Can monitoring be removed?)   Name of Team Member Notified [facility]   Treatment Team Role Attending Provider   Method of Communication Secure Message   Response See orders  (Pt does not need to be on the monitor. RN discontinued  telemetry orders.)   Notification Time 1215

## 2024-03-03 NOTE — FLOWSHEET NOTE
Pt desaturates with activity. Pt getting up to the bathroom and [facility] called. His SpO2 was as low as the low 70's while up to the toilet. Pt recovers with rest without increased O2 requirement.      03/02/24 1446 03/02/24 1500   Oxygen Therapy   SpO2 (!) 88 % 93 %   O2 Device Nasal cannula Nasal cannula   O2 Flow Rate (L/min) 4.5 L/min 4.5 L/min

## 2024-03-03 NOTE — PLAN OF CARE
Problem: Discharge Planning  Goal: Discharge to home or other facility with appropriate resources  Outcome: Progressing     Problem: Nutrition Deficit:  Goal: Optimize nutritional status  Outcome: Progressing     Problem: Skin/Tissue Integrity  Goal: Absence of new skin breakdown  Description: 1.  Monitor for areas of redness and/or skin breakdown  2.  Assess vascular access sites hourly  3.  Every 4-6 hours minimum:  Change oxygen saturation probe site  4.  Every 4-6 hours:  If on nasal continuous positive airway pressure, respiratory therapy assess nares and determine need for appliance change or resting period.  Outcome: Progressing     Problem: Pain  Goal: Verbalizes/displays adequate comfort level or baseline comfort level  Outcome: Progressing

## 2024-03-03 NOTE — FLOWSHEET NOTE
03/03/24 1339   Treatment Team Notification   Reason for Communication Patient/Family request  (family asking if physician can order a home O2 concentrator with a high flow rate. Pt's current machine goes up to 4.5 L NC. Pt is on 4-5L NC at baseline.)   Name of Team Member Notified [facility]   Treatment Team Role Attending Provider   Method of Communication Secure Message   Response No new orders   Notification Time 4949

## 2024-03-04 VITALS
RESPIRATION RATE: 22 BRPM | TEMPERATURE: 97.3 F | DIASTOLIC BLOOD PRESSURE: 73 MMHG | SYSTOLIC BLOOD PRESSURE: 145 MMHG | BODY MASS INDEX: 15.97 KG/M2 | HEART RATE: 65 BPM | WEIGHT: 111.55 LBS | HEIGHT: 70 IN | OXYGEN SATURATION: 100 %

## 2024-03-04 LAB
ANION GAP SERPL CALCULATED.3IONS-SCNC: 5 MMOL/L (ref 3–16)
BASOPHILS # BLD: 0 K/UL (ref 0–0.2)
BASOPHILS NFR BLD: 0.7 %
BUN SERPL-MCNC: 15 MG/DL (ref 7–20)
CALCIUM SERPL-MCNC: 9.1 MG/DL (ref 8.3–10.6)
CHLORIDE SERPL-SCNC: 96 MMOL/L (ref 99–110)
CO2 SERPL-SCNC: 39 MMOL/L (ref 21–32)
CREAT SERPL-MCNC: 0.6 MG/DL (ref 0.8–1.3)
DEPRECATED RDW RBC AUTO: 13 % (ref 12.4–15.4)
EOSINOPHIL # BLD: 0.1 K/UL (ref 0–0.6)
EOSINOPHIL NFR BLD: 2 %
GFR SERPLBLD CREATININE-BSD FMLA CKD-EPI: >60 ML/MIN/{1.73_M2}
GLUCOSE SERPL-MCNC: 91 MG/DL (ref 70–99)
HCT VFR BLD AUTO: 30.6 % (ref 40.5–52.5)
HGB BLD-MCNC: 10.6 G/DL (ref 13.5–17.5)
LYMPHOCYTES # BLD: 0.6 K/UL (ref 1–5.1)
LYMPHOCYTES NFR BLD: 10.9 %
MCH RBC QN AUTO: 32.3 PG (ref 26–34)
MCHC RBC AUTO-ENTMCNC: 34.7 G/DL (ref 31–36)
MCV RBC AUTO: 93.1 FL (ref 80–100)
MONOCYTES # BLD: 0.7 K/UL (ref 0–1.3)
MONOCYTES NFR BLD: 14.1 %
NEUTROPHILS # BLD: 3.8 K/UL (ref 1.7–7.7)
NEUTROPHILS NFR BLD: 72.3 %
PLATELET # BLD AUTO: 196 K/UL (ref 135–450)
PMV BLD AUTO: 8.4 FL (ref 5–10.5)
POTASSIUM SERPL-SCNC: 3.8 MMOL/L (ref 3.5–5.1)
RBC # BLD AUTO: 3.28 M/UL (ref 4.2–5.9)
SODIUM SERPL-SCNC: 140 MMOL/L (ref 136–145)
WBC # BLD AUTO: 5.2 K/UL (ref 4–11)

## 2024-03-04 PROCEDURE — 36415 COLL VENOUS BLD VENIPUNCTURE: CPT

## 2024-03-04 PROCEDURE — 85025 COMPLETE CBC W/AUTO DIFF WBC: CPT

## 2024-03-04 PROCEDURE — 2580000003 HC RX 258: Performed by: INTERNAL MEDICINE

## 2024-03-04 PROCEDURE — 94669 MECHANICAL CHEST WALL OSCILL: CPT

## 2024-03-04 PROCEDURE — 6360000002 HC RX W HCPCS: Performed by: INTERNAL MEDICINE

## 2024-03-04 PROCEDURE — 6370000000 HC RX 637 (ALT 250 FOR IP): Performed by: INTERNAL MEDICINE

## 2024-03-04 PROCEDURE — 94760 N-INVAS EAR/PLS OXIMETRY 1: CPT

## 2024-03-04 PROCEDURE — 99232 SBSQ HOSP IP/OBS MODERATE 35: CPT | Performed by: INTERNAL MEDICINE

## 2024-03-04 PROCEDURE — 94680 O2 UPTK RST&XERS DIR SIMPLE: CPT

## 2024-03-04 PROCEDURE — 80048 BASIC METABOLIC PNL TOTAL CA: CPT

## 2024-03-04 PROCEDURE — 2700000000 HC OXYGEN THERAPY PER DAY

## 2024-03-04 RX ORDER — LEVOFLOXACIN 500 MG/1
500 TABLET, FILM COATED ORAL DAILY
Qty: 7 TABLET | Refills: 0 | Status: SHIPPED | OUTPATIENT
Start: 2024-03-04 | End: 2024-03-11

## 2024-03-04 RX ADMIN — ENOXAPARIN SODIUM 30 MG: 100 INJECTION SUBCUTANEOUS at 07:41

## 2024-03-04 RX ADMIN — ASPIRIN 81 MG: 81 TABLET, COATED ORAL at 07:41

## 2024-03-04 RX ADMIN — METRONIDAZOLE 500 MG: 500 INJECTION, SOLUTION INTRAVENOUS at 07:50

## 2024-03-04 RX ADMIN — CEFEPIME 2000 MG: 2 INJECTION, POWDER, FOR SOLUTION INTRAVENOUS at 00:57

## 2024-03-04 RX ADMIN — POLYETHYLENE GLYCOL 3350 17 G: 17 POWDER, FOR SOLUTION ORAL at 07:41

## 2024-03-04 RX ADMIN — PANTOPRAZOLE SODIUM 40 MG: 40 TABLET, DELAYED RELEASE ORAL at 05:53

## 2024-03-04 RX ADMIN — AMLODIPINE BESYLATE 5 MG: 5 TABLET ORAL at 07:41

## 2024-03-04 RX ADMIN — CEFEPIME 2000 MG: 2 INJECTION, POWDER, FOR SOLUTION INTRAVENOUS at 07:47

## 2024-03-04 ASSESSMENT — PAIN SCALES - GENERAL: PAINLEVEL_OUTOF10: 0

## 2024-03-04 NOTE — PROGRESS NOTES
Pulmonary progress Note     Patient's name:  Danny Frances  Medical Record Number: [id number]  Patient's account/billing number: [id number]  Patient's YOB: 1945  Age: 78 y.o.  Date of Admission: 2/28/2024  2:21 PM  Date of Consult: 3/1/2024      Primary Care Physician: Bowen Henson Jr., MD      Code Status: Full Code    Reason for consult: Right hydropneumothorax    Assessment and Plan     Right pyopneumothorax  Bronchiectasis with acute exacerbation  IPF  Chronic respiratory failure with hypoxia  Failure to thrive      Plan:  Antibiotics per ID  Ct images from [facility] reviewed, no intervention is needed for his pyopneumothorax, (he would have prolonged air leak and non healing if we place chest tube)   Oxygen to keep sats more than 90%  Sputum for pneumonia panel and culture  Discussed with wife    HISTORY OF PRESENT ILLNESS:   Mr./Ms. Danny Frances is a 78 y.o. gentleman with past medical history stated below significant for IPF with severe ILD, bronchiectasis, chronic respiratory failure with hypoxia oxygen was admitted to the hospital with abnormal CT scan results right hydropneumothorax, CT done at [facility], I requested admission is still pending transfer.  Patient denies any fever or chills.  He has chronic nonproductive cough with no significant change.  No sick contact.              REVIEW OF SYSTEMS:  Review of Systems -   General ROS: Weight loss  Psychological ROS: negative  Ophthalmic ROS: negative  ENT ROS: negative  Allergy and Immunology ROS: negative  Hematological and Lymphatic ROS: negative  Endocrine ROS: negative  Breast ROS: negative  Respiratory ROS: Cough, shortness of breath,  Cardiovascular ROS: no chest pain or dyspnea on exertion  Gastrointestinal ROS:negative  Genito-Urinary ROS: negative  Musculoskeletal ROS: negative  Neurological ROS: negative  Dermatological ROS: negative        Physical Exam:    Vitals: BP 
      Infectious Diseases Inpatient progress Note        CHIEF COMPLAINT:     Acute on chronic hypoxic respiratory failure   right-sided pneumonia  Right hydropneumothorax  Abnormal CT chest  COPD  Idiopathic pulmonary fibrosis    HISTORY OF PRESENT ILLNESS:  78 y.o. man with a significant history for pulmonary fibrosis, chronic hypoxic respiratory failure on 4 L nasal cannula, h/o TAVR hypertension, hyperlipidemia admitted to hospital secondary to increasing shortness of breath cough and some chest pain.  Patient noticed hypoxic into the 70s with exertion with associate with some chest discomfort he underwent CT chest at an outside facility this was ordered by his pulmonary physician secondary to ongoing respiratory symptoms.  CT chest reported to be abnormal with right-sided hydropneumothorax with fibrotic changes ongoing cystic bronchiectasis and infection concern with pneumonia.  Hence patient present to the hospital for further evaluation.  He was already seen by pulmonary team here.  Labs indicate creatinine 0.6 procalcitonin 0.4 LFT normal WBC 6.2 hemoglobin 11.1 blood culture in process, MRSA probe negative, sputum cultures requested.  Given the ongoing need for IV antibiotics we are consulted for recommendations.    Interval history:   No recorded fever ongoing shortness of breath remains on nasal cannula tolerating antibiotic therapy okay WBC remains normal CRP mildly elevated:CT chest images now loaded and reviewed and CXR no PTX noted has Rt lower lobe PNA         Past Medical History:    Past Medical History:   Diagnosis Date    Hyperlipidemia     Hypertension     Idiopathic pulmonary fibrosis (HCC)     Pacemaker     [employer]       Past Surgical History:    Past Surgical History:   Procedure Laterality Date    AORTIC VALVE REPLACEMENT      BACK SURGERY      bone chip removed    COLONOSCOPY  06/28/2018    Dr Adames, polyps    ESOPHAGEAL MANOMETRY  06/27/2023    [facility]--with 24 hr PH 
    V2.0    [facility] Progress Note      Name:  Danny Frances /Age/Sex: 1945  (78 y.o. male)   MRN & CSN:  [id number] & [id number] Encounter Date/Time: 3/1/2024 8:57 AM EDT   Location:  [id number] PCP: Bowen Henson Jr., MD     Attending:Fauzia Barclay MD       Hospital Day: 3      HPI :   Chief Complaint: shortness of breath.     Danny Frances is a 78 y.o. male who presents with worsening dyspnea.  PMHx significant for pulmonary fibrosis, chronic hypoxic spray failure on 4 L nasal cannula, hypertension, chronic GERD, hyperlipidemia.  Patient has Noticing increased dyspnea with activities that even walking few steps she will get short of breath in spite of him being on 4 L nasal cannula said his pulse ox will drop to the mid 70s, he started to have some chest discomfort on the right side today.  Denied any fever or chills no nausea no vomiting.  Has been eating and drinking well, denied any urinary symptoms no constipation no diarrhea.  Patient presented to pulmonary who had CT scan done at an outside facility with concern for pneumothorax and possible infection, but results were not available since they were done at an outside facility.  Patient had tachypnea with respiratory rate of 33 blood pressure highest was 157/71, BNP was within normal limit with lactic acid of 1.0, CBC showed white blood cells of 6.2, glucose was 172.  Blood culture has been obtained in the emergency room.       Subjective:   Continues to report shortness of breath on minimal ambulation.   Continues to be on 4 liters of oxygen.   Review of Systems:      Pertinent positives and negatives discussed in HPI    Objective:     Intake/Output Summary (Last 24 hours) at 3/1/2024 1232  Last data filed at 3/1/2024 0618  Gross per 24 hour   Intake 240 ml   Output 1350 ml   Net -1110 ml        Vitals:   Vitals:    24 2335 24 0332 24 0403 24 0741   BP: 124/72 138/70  131/70   Pulse: 65 63  67   Resp: 28 25  26 
    V2.0    [facility] Progress Note      Name:  Danny Frances /Age/Sex: 1945  (78 y.o. male)   MRN & CSN:  [id number] & [id number] Encounter Date/Time: 3/2/2024 8:57 AM EDT   Location:  [id number] PCP: Bowen Henson Jr., MD     Attending:Fauzia Barclay MD       Hospital Day: 4      HPI :   Chief Complaint: shortness of breath.     Danny Frances is a 78 y.o. male who presents with worsening dyspnea.  PMHx significant for pulmonary fibrosis, chronic hypoxic spray failure on 4 L nasal cannula, hypertension, chronic GERD, hyperlipidemia.  Patient has Noticing increased dyspnea with activities that even walking few steps she will get short of breath in spite of him being on 4 L nasal cannula said his pulse ox will drop to the mid 70s, he started to have some chest discomfort on the right side today.  Denied any fever or chills no nausea no vomiting.  Has been eating and drinking well, denied any urinary symptoms no constipation no diarrhea.  Patient presented to pulmonary who had CT scan done at an outside facility with concern for pneumothorax and possible infection, but results were not available since they were done at an outside facility.  Patient had tachypnea with respiratory rate of 33 blood pressure highest was 157/71, BNP was within normal limit with lactic acid of 1.0, CBC showed white blood cells of 6.2, glucose was 172.  Blood culture has been obtained in the emergency room.       Subjective:   Continues to be on 4 liters of oxygen.   Denies any new complaints, reported improved shortness of breath.   Review of Systems:      Pertinent positives and negatives discussed in HPI    Objective:     Intake/Output Summary (Last 24 hours) at 3/2/2024 1300  Last data filed at 3/2/2024 1229  Gross per 24 hour   Intake 10 ml   Output 650 ml   Net -640 ml        Vitals:   Vitals:    24 0903 24 0908 24 0925 24 1225   BP:   137/74 128/70   Pulse: 83 81  83   Resp: (!) 46 (!) 37  20 
   03/04/24 1542   Resting (Room Air)   SpO2 86   HR 95   Resting (On O2)   SpO2 93   HR 94   O2 Device Nasal cannula   O2 Flow Rate (l/min) 4 l/min   During Walk (On O2)   SpO2 81   HR 96   O2 Device Nasal cannula   O2 Flow Rate (l/min) 4 l/min   Need Additional O2 Flow Rate Rows Yes   O2 Flow Rate (l/min) 5 l/min   O2 Saturation 84   O2 Flow Rate (l/min) 6 l/min   O2 Saturation 86   O2 Flow Rate (l/min) 7 l/min   O2 Saturation 89   O2 Flow Rate (l/min) 8 l/min   O2 Saturation 92   Walk/Assistance Device Ambulation   Rate of Dyspnea 1   After Walk   SpO2 94   HR 93   O2 Device Nasal cannula   O2 Flow Rate (l/min) 4 l/min   Rate of Dyspnea 0   Does the Patient Qualify for Home O2 Yes   Liter Flow at Rest 4   Liter Flow on Exertion 8   Does the Patient Need Portable Oxygen Tanks Yes     Mitchell Talbot RCP  
  Physician Progress Note      PATIENT:               BRAD BAKER  CSN #:                  [id number]  :                       1945  ADMIT DATE:       2024 2:21 PM  DISCH DATE:  RESPONDING  PROVIDER #:        Fauzia Barclay MD          QUERY TEXT:    Dear Dr. Barclay,  Pt admitted with R pyopneumothorax and noted possible pneumonia in H and P.    Noted documentation of Right sided pneumonia on -3/1 by ordered ID   consultant.  If possible, please document in progress notes and discharge   summary:    The medical record reflects the following:  Risk Factors: Hx Interstitial lung disease, bronchiectasis, chronic resp   failure on Home O2,  current noted pyopneumothorax  Clinical Indicators:  H and P notes \"Principal Problem:  Pyopneumothorax\"   and \"possible pneumonia\".  ID consult notes \"Unfortunately progressive   lung disease with the fibrosis CT chest is abnormal with ongoing right-sided   pneumonia effusion and hydropneumothorax\",  Treatment: Cefepime, Vanc, Pulm consult, ID consult, add Flagyl  Thank you,  kaylie Rbuio RN, CDS  [email or web address]  Options provided:  -- R sided pneumonia confirmed present on admission  -- Defer to ID consultant documentation regarding R sided pneumonia  -- Other - I will add my own diagnosis  -- Disagree - Not applicable / Not valid  -- Disagree - Clinically unable to determine / Unknown  -- Refer to Clinical Documentation Reviewer    PROVIDER RESPONSE TEXT:    The diagnosis of R sided pneumonia was confirmed as present on admission.    Query created by: Kaylie Waters on 3/4/2024 10:52 AM      QUERY TEXT:    Dear Dr. Barclay,  Pt admitted with R pyopneumothorax.  Pt noted to have R sided pneumonia per ID   and pt being treated with Cefepime. If possible, please document in the   progress notes and discharge summary if you are evaluating and/or treating any   of the following:      Note: CAP and HCAP indicate where the pneumonia was 
4 Eyes Skin Assessment     NAME:  Danny Frances  YOB: 1945  MEDICAL RECORD NUMBER:  [id number]    The patient is being assessed for  Admission    I agree that at least one RN has performed a thorough Head to Toe Skin Assessment on the patient. ALL assessment sites listed below have been assessed.      Areas assessed by both nurses:    Head, Face, Ears, Shoulders, Back, Chest, Arms, Elbows, Hands, Sacrum. Buttock, Coccyx, Ischium, Legs. Feet and Heels, and Under Medical Devices         Does the Patient have a Wound? No noted wound(s)       Jay Prevention initiated by RN: No  Wound Care Orders initiated by RN: No    Pressure Injury (Stage 3,4, Unstageable, DTI, NWPT, and Complex wounds) if present, place Wound referral order by RN under : No    New Ostomies, if present place, Ostomy referral order under : No     Nurse 1 eSignature: Electronically signed by Moira Paige RN on 2/28/24 at 11:27 PM EST    **SHARE this note so that the co-signing nurse can place an eSignature**    Nurse 2 eSignature: Electronically signed by Michelle Lawrence RN on 2/28/24 at 11:28 PM EST   
Clinical Pharmacy Note  Vancomycin Consult    Pharmacy consult received for one-time dose of vancomycin in the Emergency Department per JOSHUA Barry    Ht Readings from Last 1 Encounters:   02/28/24 1.778 m (5' 10\")        Wt Readings from Last 1 Encounters:   02/28/24 50.8 kg (112 lb)         Assessment/Plan:  Vancomycin 1,000 mg IVPB x 1 in ED.  If vancomycin is to continue on admission and pharmacy is to manage dosing, please re-consult with admission orders.    Jose Anderson RPH, PharmD, BCPS  2/28/2024 4:15 PM         
Comprehensive Nutrition Assessment    Type and Reason for Visit:  Initial, Positive Nutrition Screen (weight loss, decreased appetite)    Nutrition Recommendations/Plan:   Liberalize to 3-4 gm Na diet  Ensure vanilla tid  Will monitor nutritional adequacy, nutrition-related labs, weights, BMs, and clinical progress      Malnutrition Assessment:  Malnutrition Status:  Severe malnutrition (03/01/24 0149)    Context:  Chronic Illness     Findings of the 6 clinical characteristics of malnutrition:  Energy Intake:  75% or less estimated energy requirements for 1 month or longer  Weight Loss:  Greater than 20% over 1 year     Body Fat Loss:  Severe body fat loss Triceps, Orbital, Buccal region   Muscle Mass Loss:  Severe muscle mass loss Clavicles (pectoralis & deltoids), Calf (gastrocnemius), Hand (interosseous), Thigh (quadriceps), Temples (temporalis)  Fluid Accumulation:  Unable to assess     Strength:  Not Performed    Nutrition Assessment:    Patient admitted with pneumothorax, no surgery planned at this time.  On IV antibiotics.  Visibly underweight.  Patient does drink 2 Owyn plant based nutrition supplements per day.  RD liberalized low sodium 2 gm to 3-4 gm to allow more menu items.  Current meal intake 25-50% meals.  RD suggested Probiotic containing foods like Activia once at home since on IV antibiotics.  Severe malnutrition identified due to 20% greater weight loss in the past year, po intake less than 75% in the past month, and severe muscle loss and severe subcutaneous fat loss, see malnutrition criteria.    Nutrition Related Findings:    labs reviewed, janiya BOBO on 3/1; does not wear dentures, at home Wound Type: None       Current Nutrition Intake & Therapies:    Average Meal Intake: 26-50%  Average Supplements Intake: Unable to assess  ADULT DIET; Regular; No Added Salt (3-4 gm)    Anthropometric Measures:  Height: 177.8 cm (5' 10\")  Ideal Body Weight (IBW): 166 lbs (75 kg)       Current Body 
Medication Reconciliation    List of medications patient is currently taking is complete.     Source of information: 1. Conversation with patient and wife at bedside                                      2. EPIC records         Notes regarding home medications:   1. Patient reports taking morning medications and the only meds he takes at night are the amlodipine and pravastatin      Woody Payne, [facility]   2/28/2024  4:30 PM    
Patient transferred to PCU from ED via bed. Patient connected to bedside telemetry and verified with CMU. Patient oriented to room and call light placed within reach.  
Pt is refusing BIPAP and is completely alert and oriented with no signs of confusion. Pt states no one spoke with him about the BIPAP. Pt is saturating well on his home dose of oxygen.   /70   Pulse 71   Temp 97.4 °F (36.3 °C) (Oral)   Resp (!) 33   Ht 1.778 m (5' 10\")   Wt 49.7 kg (109 lb 9.1 oz)   SpO2 95%   BMI 15.72 kg/m²     
Pulmonary Progress Note    Date of Admission: 2/28/2024   LOS: 3 days       CC:  Chief Complaint   Patient presents with    sent by doctor     Pt sent by Dr. Garza after CT scan d/t thinking pt has infection in lungs, per pt. Pt arrives on 5L nasal cannula at 95%. Baseline 4L. Pt has been feeling generalized weakness and SOB with just a couple steps x 1 month        Subjective:   Eat ing.   Cough with phlegm      ROS:   No nausea  No Vomiting  No chest pain       Assessment:          Plan:     This note may have been transcribed using Dragon Dictation software. Please disregard any translational errors.       Hospital Day: 3     Right pyopneumothorax with bronchtiectaiss and IPF   Severe lung disease at baseline  Follow up chest X-ray tomorrow.   antibiotics per ID        Chronic hypercapnia   Refusing bipap       Code status.    Confirmed with pt and wife with nurse as witness, he is a DNR.  Order placed.                 Data:        PHYSICAL EXAM:   Blood pressure 137/74, pulse 81, temperature 98.1 °F (36.7 °C), temperature source Oral, resp. rate (!) 37, height 1.778 m (5' 10\"), weight 50.6 kg (111 lb 8.8 oz), SpO2 100 %.'  Body mass index is 16.01 kg/m².   Gen: No distress.    ENT:   Resp: No accessory muscle use. No crackles. No wheezes. No rhonchi.    CV: Regular rate. Regular rhythm. No murmur or rub. No edema.   Skin: Warm, dry, normal texture and turgor. No nodule on exposed extremities.   M/S: No cyanosis. No clubbing. No joint deformity.  Psych: Oriented x 3. No anxiety.  Awake. Alert. Intact judgement and insight. Good Mood / Affect.  Memory appears in tact       Medications:    Scheduled Meds:   metroNIDAZOLE  500 mg IntraVENous Q8H    cefepime  2,000 mg IntraVENous q8h    amLODIPine  5 mg Oral Daily    aspirin  81 mg Oral Daily    pantoprazole  40 mg Oral QAM AC    losartan  50 mg Oral Nightly    polyethylene glycol  17 g Oral Daily    pravastatin  80 mg Oral Nightly    sodium chloride flush  5-40 mL 
Pulmonary Progress Note    Date of Admission: 2/28/2024   LOS: 5 days       CC:  Chief Complaint   Patient presents with    sent by doctor     Pt sent by Dr. Garza after CT scan d/t thinking pt has infection in lungs, per pt. Pt arrives on 5L nasal cannula at 95%. Baseline 4L. Pt has been feeling generalized weakness and SOB with just a couple steps x 1 month        Subjective:  Would like to d/c    ROS:   No nausea  No Vomiting  No chest pain       Assessment:          Plan:     This note may have been transcribed using Dragon Dictation software. Please disregard any translational errors.       Hospital Day: 5     Right pyopneumothorax with bronchtiectaiss and IPF   Severe lung disease at baseline  Follow up chest X-ray  without clear change.   antibiotics per ID  culture NGTD  Still requiring 4 L NC       Chronic hypercapnia   Refusing bipap   D/c bipap          Sent message to ID. Ok to d/c from pulm pov.   Defer to ID if he can d/c on PO abx.         Data:        PHYSICAL EXAM:   Blood pressure 123/65, pulse 70, temperature 98.1 °F (36.7 °C), temperature source Oral, resp. rate 16, height 1.778 m (5' 10\"), weight 50.6 kg (111 lb 8.8 oz), SpO2 98 %.'  Body mass index is 16.01 kg/m².   Gen: No distress.    ENT:   Resp: No accessory muscle use. No crackles. No wheezes. No rhonchi.    CV: Regular rate. Regular rhythm. No murmur or rub. No edema.   Skin: Warm, dry, normal texture and turgor. No nodule on exposed extremities.   M/S: No cyanosis. No clubbing. No joint deformity.  Psych: Oriented x 3. No anxiety.  Awake. Alert. Intact judgement and insight. Good Mood / Affect.  Memory appears in tact       Medications:    Scheduled Meds:   metroNIDAZOLE  500 mg IntraVENous Q8H    cefepime  2,000 mg IntraVENous q8h    amLODIPine  5 mg Oral Daily    aspirin  81 mg Oral Daily    pantoprazole  40 mg Oral QAM AC    losartan  50 mg Oral Nightly    polyethylene glycol  17 g Oral Daily    pravastatin  80 mg Oral Nightly    
limited by endurance     Plan   Physical Therapy Plan  General Plan: 3-5 times per week  Current Treatment Recommendations: Balance training, Functional mobility training, Endurance training, Strengthening, Gait training, Neuromuscular re-education, Patient/Caregiver education & training, Safety education & training, Equipment evaluation, education, & procurement, Therapeutic activities  Safety Devices  Type of Devices: Call light within reach, Gait belt, Left in chair     Restrictions  Restrictions/Precautions  Restrictions/Precautions: Fall Risk  Position Activity Restriction  Other position/activity restrictions: 4L O2 baseline     Subjective   General  Chart Reviewed: Yes  Patient assessed for rehabilitation services?: Yes  Additional Pertinent Hx: Danny Frances is a 78 y.o. male who presents with worsening dyspnea. Pt found to have Right pyopneumothorax.  Response To Previous Treatment: Not applicable  Family / Caregiver Present: Yes (spouse)  Referring Practitioner: Fauzia Barclay MD  Referral Date : 03/01/24  Diagnosis: pyopneumothorax.  Follows Commands: Within Functional Limits  Subjective  Subjective: Pt is agreeable to PT.         Social/Functional History  Social/Functional History  Lives With: Spouse  Type of Home: House  Home Layout: Able to Live on Main level with bedroom/bathroom, Bed/Bath upstairs  Home Access: Stairs to enter with rails  Entrance Stairs - Number of Steps: 13 SUHA with rail thru garage and basement level.  Bathroom Shower/Tub: Walk-in shower, Shower chair without back  Bathroom Toilet: Handicap height (next to vanity)  Bathroom Equipment: Grab bars in shower  Home Equipment: Cane, Rollator, Wheelchair-manual, Wheelchair-electric  Has the patient had two or more falls in the past year or any fall with injury in the past year?: No  ADL Assistance: Independent (assist with showers)  Homemaking Responsibilities: No  Ambulation Assistance: Independent (uses power w/c in 
alternative oral replacement, 40 mEq, PRN   Or  potassium chloride, 10 mEq, PRN  magnesium sulfate, 2,000 mg, PRN  ondansetron, 4 mg, Q8H PRN   Or  ondansetron, 4 mg, Q6H PRN  polyethylene glycol, 17 g, Daily PRN  acetaminophen, 650 mg, Q6H PRN   Or  acetaminophen, 650 mg, Q6H PRN          Labs and Imaging   CT ABDOMEN PELVIS W IV CONTRAST Additional Contrast? None    Result Date: 6/8/2023  EXAMINATION: CT OF THE ABDOMEN AND PELVIS WITH CONTRAST 6/8/2023 7:52 pm TECHNIQUE: CT of the abdomen and pelvis was performed with the administration of intravenous contrast. Multiplanar reformatted images are provided for review. Automated exposure control, iterative reconstruction, and/or weight based adjustment of the mA/kV was utilized to reduce the radiation dose to as low as reasonably achievable. COMPARISON: 09/11/2022 HISTORY: ORDERING SYSTEM PROVIDED HISTORY: abd pain, diarrhea TECHNOLOGIST PROVIDED HISTORY: Reason for exam:->abd pain, diarrhea Additional Contrast?->None Decision Support Exception - unselect if not a suspected or confirmed emergency medical condition->Emergency Medical Condition (MA) Reason for Exam: abd pain, diarrhea FINDINGS: Lower Chest: Coarse interstitial findings in the lung bases are again demonstrated compatible with fibrotic lung process.  Overall this appears similar compared to the prior exam. Organs: The liver, pancreas, spleen, kidneys, and adrenals reveal no acute findings. No inflammatory change identified in the gallbladder fossa. GI/Bowel: Liquid stool is present throughout the colon.  The sigmoid colon takes an unusual course towards the right abdomen and there is mild swirling of the mesentery noted without twisting to suggest volvulus.  Loops of colon are mildly distended with gas.  No significant small bowel distension is appreciated.  No wall thickening or inflammatory change.  No pneumatosis or portal venous gas. Pelvis: Mild diffuse bladder wall thickening. 
Calcified atheromatous plaque is present.  No lymphadenopathy. Bones/Soft Tissues: Small fat containing umbilical hernia.  Severe compression deformity of L2 is again noted.     1.  Findings compatible diarrheal process.  Mild colonic distension is noted with mild mesenteric swirling but no evidence for volvulus.  This may be due to a partial obstructing process due to an underlying internal hernia.  No significant small bowel distension. 2.  Additional chronic and benign findings, as described.       CBC:   Recent Labs     02/28/24  1512 02/29/24  0549   WBC 6.2 5.2   HGB 11.1* 11.1*    213     BMP:    Recent Labs     02/28/24  1512 02/29/24  0549    144   K 4.1 3.6   CL 94* 96*   CO2 40* 45*   BUN 18 14   CREATININE 0.6* 0.5*   GLUCOSE 172* 90     Hepatic:   Recent Labs     02/28/24  1512   AST 25   ALT 10   BILITOT <0.2   ALKPHOS 50     Lipids:   No results found for: \"CHOL\", \"HDL\", \"TRIG\"  Hemoglobin A1C: No results found for: \"LABA1C\"  TSH: No results found for: \"TSH\"  Troponin: No results found for: \"TROPONINT\"  Lactic Acid: No results for input(s): \"LACTA\" in the last 72 hours.  BNP:   No results for input(s): \"PROBNP\" in the last 72 hours.  UA:  No results found for: \"NITRU\", \"COLORU\", \"PHUR\", \"LABCAST\", \"WBCUA\", \"RBCUA\", \"MUCUS\", \"TRICHOMONAS\", \"YEAST\", \"BACTERIA\", \"CLARITYU\", \"SPECGRAV\", \"LEUKOCYTESUR\", \"UROBILINOGEN\", \"BILIRUBINUR\", \"BLOODU\", \"GLUCOSEU\", \"KETUA\", \"AMORPHOUS\"  Urine Cultures: No results found for: \"LABURIN\"  Blood Cultures: No results found for: \"BC\"  No results found for: \"BLOODCULT2\"  Organism: No results found for: \"ORG\"      Assessment and Recommendations   Danny Frances is a 78 y.o. male who presents with  Pyopneumothorax       1.  Right pyopneumothorax in a patient with pulmonary fibrosis, patient to be evaluated by pulmonary, the recommendation was to hold on chest tube placement for now.  Discussed with Dr. Guevara from pulmonology, who kindly facilitated requiring for 
and provided this pt w/ RW-- (per PT's note:\"Surface: Level tile  Device: No Device  Other Apparatus: O2 (4L)  Assistance: Contact guard assistance  Gait Deviations: Slow Cee;Decreased step length;Decreased step height  Distance: 5'  Comments: O2 sats at 97% on 4L O2. HR in 80s. RR as high as 42. Steady.\"  Vision  Vision: Within Functional Limits  Vision Exceptions: Wears glasses for reading  Hearing  Hearing: Within functional limits  Cognition  Overall Cognitive Status: WNL  Cognition Comment: mild SOB w/ conversation  Orientation  Overall Orientation Status: Within Normal Limits  Orientation Level: Oriented X4                  Education Given To: Patient  Education Provided: Role of Therapy;Energy Conservation  Education Provided Comments: educated on purpose of OT services, recommend  OT to address ECT during ADLs & household t/f  Education Method: Demonstration;Verbal  Barriers to Learning: None  Education Outcome: Verbalized understanding;Continued education needed  LUE AROM (degrees)  LUE AROM : WNL  Left Hand AROM (degrees)  Left Hand AROM: WNL  RUE AROM (degrees)  RUE AROM : WNL  Right Hand AROM (degrees)  Right Hand AROM: WNL                   AM-PAC - ADL  AM-PAC Daily Activity - Inpatient   How much help is needed for putting on and taking off regular lower body clothing?: A Little  How much help is needed for bathing (which includes washing, rinsing, drying)?: A Little  How much help is needed for toileting (which includes using toilet, bedpan, or urinal)?: A Little  How much help is needed for putting on and taking off regular upper body clothing?: None  How much help is needed for taking care of personal grooming?: None  How much help for eating meals?: None  AM-PAC Inpatient Daily Activity Raw Score: 21  AM-PAC Inpatient ADL T-Scale Score : 44.27  ADL Inpatient CMS 0-100% Score: 32.79  ADL Inpatient CMS G-Code Modifier : CJ      Goals  Short Term Goals  Time Frame for Short Term Goals: by 2-3 
disease on the right, with a   background of severe pulmonary fibrosis         XR CHEST PORTABLE   Final Result   No definite pneumothorax.      Findings most consistent with chronic pulmonary fibrosis and pulmonary   emphysema.  Superimposed infiltrate, particularly at the right base, not   excluded.           Ct  chest  2/28/24      Progression of lung disease when compared to prior exam. On the current exam there is no normal lung parenchyma appreciated within the left lung. Appearance could represent diffuse severe cystic bronchiectasis.    The fibrotic changes noted within the right lung have also progressed when compared to prior exam.    There is a small right hydropneumothorax. Air-fluid levels seen in the posterior aspect of the pleural space along the upper aspect of the right lung with locules of air also seen in the dependent portion of the effusion at the lung base. Infection is  not excluded.    Small left pleural effusion.    Other findings as described above.    NOTE: [facility]. To on 2/28/2024 at 4:06 PM. He indicated that he sent the patient to [facility] for further evaluation.  Narrative      All pertinent images and reports for the current Hospitalization were reviewed by me.    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 Pyopneumothorax ([facility])  J86.9       2. Chronic respiratory failure with hypoxia

## 2024-03-04 NOTE — CARE COORDINATION
Spoke to Bud Taylor & informed of patient qualifying for more O2 & need for 10L concentrator at home. Requests testing and DME orders be faxed to [facility] at [phone], documents faxed. Patient says he is okay with current portable concentrator for transport home. Pt/spouse to f/u with [facility] when get home.    Electronically signed by Betty Carpenter RN Case Management on 3/4/2024 at 4:15 PM

## 2024-03-04 NOTE — CARE COORDINATION
Case Management Discharge Note          Date / Time of Note: 3/4/2024 3:34 PM                  Patient Name: Danny Frances   YOB: 1945  Diagnosis: Pyopneumothorax (HCC) [J86.9]  IPF (idiopathic pulmonary fibrosis) (HCC) [J84.112]  Chronic respiratory failure with hypoxia and hypercapnia (HCC) [J96.11, J96.12]   Date / Time: 2/28/2024  2:21 PM    Financial:  Payor: MEDICARE / Plan: MEDICARE PART A AND B / Product Type: *No Product type* /      Pharmacy:    [facility]/pharmacy #[id number] - [place], [place] - [address] - P [phone] - F [phone]  [address]  [place] [address]  Phone: [phone] Fax: [phone]      Assistance purchasing medications?: Potential Assistance Purchasing Medications: No  Assistance provided by Case Management: None at this time    DISCHARGE Disposition: Home with Home Health Care    Home Care:  Home Care ordered at discharge: Yes  Home Care Agency:  [facility]  Phone: [phone]  Fax: n/a  Orders faxed: Yomi able to pull orders from MD    Home Oxygen and Respiratory Equipment:  Oxygen needed at discharge?: Yes  Home Oxygen Company: [employer]  Phone: [phone]  Portable tank available for discharge?: Yes    Transportation:  Transportation PLAN for discharge: family   Mode of Transport: Private Car  Time of Transport: before 6pm    IMM Completed:   Yes, Case management has presented and reviewed IMM letter #2.       IMM Letter date given:: 03/04/24  IMM Letter time given:: 1530.   Patient and/or family/POA verbalized understanding of their medicare rights and appeal process if needed. Patient and/or family/POA has signed, initialed and placed the date and time on IMM letter #2 on the the appropriate lines. Copy of letter offered and they are aware that the original copy of IMM letter #2 is available prior to discharge from the paper chart on the unit.  Electronic documentation has been entered into epic for IMM letter #2 and original paper

## 2024-03-04 NOTE — DISCHARGE INSTR - COC
Continuity of Care Form    Patient Name: Danny Frances   :  1945  MRN:  [id number]    Admit date:  2024  Discharge date:  3/4/2024    Code Status Order: Limited   Advance Directives:     Admitting Physician:  Kayley Boyd MD  PCP: Bowen Henson Jr., MD    Discharging Nurse: Violeta Galaviz  Discharging Hospital Unit/Room#: J6E-3516/5252-01  Discharging Unit Phone Number: [phone]    Emergency Contact:   Extended Emergency Contact Information  Primary Emergency Contact: Roddy Frances  Address: [address] [place]  Home Phone: [phone]  Relation: Spouse    Past Surgical History:  Past Surgical History:   Procedure Laterality Date    AORTIC VALVE REPLACEMENT      BACK SURGERY      bone chip removed    COLONOSCOPY  2018    Dr Adames, polyps    ESOPHAGEAL MANOMETRY  2023    [facility]--with 24 hr PH impedence study    PACEMAKER PLACEMENT      [employer]    ROTATOR CUFF REPAIR Left     UPPER GASTROINTESTINAL ENDOSCOPY N/A 2023    EGD BIOPSY performed by Mitchell Iqbal MD at [facility] ENDOSCOPY    UPPER GASTROINTESTINAL ENDOSCOPY  2023    EGD POLYP HOT SNARE performed by Mitchell Iqbal MD at [facility] ENDOSCOPY    UPPER GASTROINTESTINAL ENDOSCOPY N/A 2023    ESOPHAGEAL MANOMETRY, 24 HR IMPEDANCE POTENTIAL OF HYDROGEN performed by Mitchell Iqbal MD at [facility] ENDOSCOPY       Immunization History:     There is no immunization history on file for this patient.    Active Problems:  Patient Active Problem List   Diagnosis Code    Pyopneumothorax ([facility]) J86.9    Chronic respiratory failure with hypoxia and hypercapnia ([facility]) J96.11, J96.12    IPF (idiopathic pulmonary fibrosis) ([facility]) J84.112    Hydropneumothorax J94.8    Pneumonia of right lower lobe due to infectious organism J18.9    Bronchiectasis with acute exacerbation ([facility]) J47.1    Cachexia associated with pulmonary fibrosis ([facility]) J84.10, E88.A    History of transcatheter

## 2024-03-04 NOTE — DISCHARGE SUMMARY
murmurs, rubs or gallops.  Abdomen: Soft, non-tender, non-distended with normal bowel sounds.  Musculoskeletal: No clubbing, cyanosis or edema bilaterally.  Full range of motion without deformity.  Neurologic:  Neurovascularly intact without any focal sensory/motor deficits. Cranial nerves: II-XII intact, grossly non-focal.  Psychiatric: Alert and oriented, thought content appropriate, normal insight  Capillary Refill: Brisk, 3 seconds, normal   Peripheral Pulses: +2 palpable, equal bilaterally   No new findings on exam       Labs: For convenience and continuity at follow-up the following most recent labs are provided:      CBC:    Lab Results   Component Value Date/Time    WBC 5.2 03/04/2024 06:14 AM    HGB 10.6 03/04/2024 06:14 AM    HCT 30.6 03/04/2024 06:14 AM     03/04/2024 06:14 AM       Renal:    Lab Results   Component Value Date/Time     03/04/2024 06:14 AM    K 3.8 03/04/2024 06:14 AM    K 3.6 02/29/2024 05:49 AM    CL 96 03/04/2024 06:14 AM    CO2 39 03/04/2024 06:14 AM    BUN 15 03/04/2024 06:14 AM    CREATININE 0.6 03/04/2024 06:14 AM    CALCIUM 9.1 03/04/2024 06:14 AM         Significant Diagnostic Studies    Radiology:   XR CHEST PORTABLE   Final Result   No obvious change in pleural-parenchymal disease on the right, with a   background of severe pulmonary fibrosis         XR CHEST PORTABLE   Final Result   No definite pneumothorax.      Findings most consistent with chronic pulmonary fibrosis and pulmonary   emphysema.  Superimposed infiltrate, particularly at the right base, not   excluded.                Consults:     IP CONSULT TO HOSPITALIST  IP CONSULT TO PULMONOLOGY  IP CONSULT TO PULMONOLOGY  IP CONSULT TO INFECTIOUS DISEASES    Disposition:  home      Condition at Discharge: Stable    Discharge Instructions/Follow-up:    - follow up with pulmonology .  - levaquin for 7 days.   - will reassess need for higher concentration of oxygen prior to DC   - prognosis is guarded and that

## 2024-03-04 NOTE — CARE COORDINATION
[facility]/ [place]     DC order noted, all docs needed have been faxed to [facility] for home care services.    Home care to see patient within 24-48 hrs    Yomi Xiong RN, BSN CTN  [facility] [phone]

## 2024-03-04 NOTE — PLAN OF CARE
Problem: Discharge Planning  Goal: Discharge to home or other facility with appropriate resources  Outcome: Progressing  Flowsheets (Taken 3/3/2024 2216)  Discharge to home or other facility with appropriate resources: Identify barriers to discharge with patient and caregiver     Problem: Nutrition Deficit:  Goal: Optimize nutritional status  Outcome: Progressing     Problem: Skin/Tissue Integrity  Goal: Absence of new skin breakdown  Description: 1.  Monitor for areas of redness and/or skin breakdown  2.  Assess vascular access sites hourly  3.  Every 4-6 hours minimum:  Change oxygen saturation probe site  4.  Every 4-6 hours:  If on nasal continuous positive airway pressure, respiratory therapy assess nares and determine need for appliance change or resting period.  Outcome: Progressing     Problem: Pain  Goal: Verbalizes/displays adequate comfort level or baseline comfort level  Outcome: Progressing     Problem: Safety - Adult  Goal: Free from fall injury  Outcome: Progressing

## 2024-03-08 NOTE — CARE COORDINATION
[facility]    Patient had SOC for HC on 3/7/24, declined all therapy services, declined having any needs for nursing.   Not opened for HC at this time.     Yomi Xiong RN, BSN CTN  [facility] [phone]

## (undated) DEVICE — SPONGE CLN W2XL425IN POLYUR ENDOSCP BS TBLR ENZ

## (undated) DEVICE — FORMALIN CLEAR VIAL 20 ML 10%

## (undated) DEVICE — BITE BLOCK ENDOSCP AD 60 FR W/ ADJ STRP PLAS GRN BLOX

## (undated) DEVICE — Device

## (undated) DEVICE — BASIC SINGLE BASIN 1-LF: Brand: MEDLINE INDUSTRIES, INC.

## (undated) DEVICE — SYRINGE MED 10ML POLYPR LUERSLIP TIP FLAT TOP W/O SFTY DISP

## (undated) DEVICE — SOLUTION IV IRRIG WATER 1000ML POUR BRL 2F7114

## (undated) DEVICE — ENDOSCOPY KIT: Brand: MEDLINE INDUSTRIES, INC.

## (undated) DEVICE — RETRIEVER ENDOSCP L160CM SHTH DIA2.5MM NET 4X5.5CM PLAT

## (undated) DEVICE — SOLUTION MNOMTR 80ML ES HI VISC FOR VISCOUS SWALLOW DONE

## (undated) DEVICE — FORCEPS BX 240CM 2.4MM L NDL RAD JAW 4 M00513334

## (undated) DEVICE — SINGLE-USE POLYPECTOMY SNARE: Brand: CAPTIFLEX

## (undated) DEVICE — ELECTRODE PT RET AD L9FT HI MOIST COND ADH HYDRGEL CORDED